# Patient Record
Sex: FEMALE | Race: WHITE | NOT HISPANIC OR LATINO | Employment: FULL TIME | URBAN - METROPOLITAN AREA
[De-identification: names, ages, dates, MRNs, and addresses within clinical notes are randomized per-mention and may not be internally consistent; named-entity substitution may affect disease eponyms.]

---

## 2017-01-09 ENCOUNTER — OFFICE VISIT (OUTPATIENT)
Dept: INTERNAL MEDICINE | Facility: CLINIC | Age: 22
End: 2017-01-09
Payer: COMMERCIAL

## 2017-01-09 VITALS
HEART RATE: 100 BPM | OXYGEN SATURATION: 99 % | WEIGHT: 219.3 LBS | HEIGHT: 67 IN | TEMPERATURE: 98.4 F | DIASTOLIC BLOOD PRESSURE: 84 MMHG | BODY MASS INDEX: 34.42 KG/M2 | SYSTOLIC BLOOD PRESSURE: 128 MMHG

## 2017-01-09 DIAGNOSIS — Z72.0 TOBACCO ABUSE: ICD-10-CM

## 2017-01-09 DIAGNOSIS — Z00.00 ENCOUNTER FOR ROUTINE ADULT HEALTH EXAMINATION WITHOUT ABNORMAL FINDINGS: Primary | ICD-10-CM

## 2017-01-09 DIAGNOSIS — E78.5 HYPERLIPIDEMIA LDL GOAL <160: ICD-10-CM

## 2017-01-09 DIAGNOSIS — F33.0 MAJOR DEPRESSIVE DISORDER, RECURRENT EPISODE, MILD (H): ICD-10-CM

## 2017-01-09 LAB
ALBUMIN SERPL-MCNC: 3.6 G/DL (ref 3.4–5)
ALP SERPL-CCNC: 93 U/L (ref 40–150)
ALT SERPL W P-5'-P-CCNC: 31 U/L (ref 0–50)
ANION GAP SERPL CALCULATED.3IONS-SCNC: 6 MMOL/L (ref 3–14)
AST SERPL W P-5'-P-CCNC: 16 U/L (ref 0–45)
BILIRUB SERPL-MCNC: 0.4 MG/DL (ref 0.2–1.3)
BUN SERPL-MCNC: 7 MG/DL (ref 7–30)
CALCIUM SERPL-MCNC: 9.3 MG/DL (ref 8.5–10.1)
CHLORIDE SERPL-SCNC: 103 MMOL/L (ref 94–109)
CHOLEST SERPL-MCNC: 216 MG/DL
CO2 SERPL-SCNC: 29 MMOL/L (ref 20–32)
CREAT SERPL-MCNC: 0.62 MG/DL (ref 0.52–1.04)
GFR SERPL CREATININE-BSD FRML MDRD: NORMAL ML/MIN/1.7M2
GLUCOSE SERPL-MCNC: 86 MG/DL (ref 70–99)
HDLC SERPL-MCNC: 63 MG/DL
HGB BLD-MCNC: 12.9 G/DL (ref 11.7–15.7)
LDLC SERPL CALC-MCNC: 117 MG/DL
NONHDLC SERPL-MCNC: 153 MG/DL
POTASSIUM SERPL-SCNC: 3.6 MMOL/L (ref 3.4–5.3)
PROT SERPL-MCNC: 7 G/DL (ref 6.8–8.8)
SODIUM SERPL-SCNC: 138 MMOL/L (ref 133–144)
TRIGL SERPL-MCNC: 178 MG/DL
TSH SERPL DL<=0.005 MIU/L-ACNC: 0.63 MU/L (ref 0.4–4)

## 2017-01-09 PROCEDURE — 36415 COLL VENOUS BLD VENIPUNCTURE: CPT | Performed by: INTERNAL MEDICINE

## 2017-01-09 PROCEDURE — 84443 ASSAY THYROID STIM HORMONE: CPT | Performed by: INTERNAL MEDICINE

## 2017-01-09 PROCEDURE — 80061 LIPID PANEL: CPT | Performed by: INTERNAL MEDICINE

## 2017-01-09 PROCEDURE — 85018 HEMOGLOBIN: CPT | Performed by: INTERNAL MEDICINE

## 2017-01-09 PROCEDURE — 80053 COMPREHEN METABOLIC PANEL: CPT | Performed by: INTERNAL MEDICINE

## 2017-01-09 PROCEDURE — 99395 PREV VISIT EST AGE 18-39: CPT | Performed by: INTERNAL MEDICINE

## 2017-01-09 RX ORDER — CITALOPRAM HYDROBROMIDE 40 MG/1
40 TABLET ORAL DAILY
Qty: 90 TABLET | Refills: 3 | Status: SHIPPED | OUTPATIENT
Start: 2017-01-09 | End: 2018-02-25

## 2017-01-09 NOTE — PROGRESS NOTES
SUBJECTIVE:     CC: Ellen Logan is an 21 year old woman who presents for preventive health visit.     Healthy Habits:    Do you get at least three servings of calcium containing foods daily (dairy, green leafy vegetables, etc.)? yes    Amount of exercise or daily activities, outside of work: 4 day(s) per week    Problems taking medications regularly No    Medication side effects: No    Have you had an eye exam in the past two years? no    Do you see a dentist twice per year? no  Do you have sleep apnea, excessive snoring or daytime drowsiness?no    Today's PHQ-2 Score:   PHQ-2 ( 1999 Pfizer) 3/9/2016   Q1: Little interest or pleasure in doing things 0   Q2: Feeling down, depressed or hopeless 0   PHQ-2 Score 0       Abuse: Current or Past(Physical, Sexual or Emotional)- No  Do you feel safe in your environment - Yes    Social History   Substance Use Topics     Smoking status: Current Every Day Smoker -- 0.50 packs/day     Types: Cigarettes     Smokeless tobacco: Not on file     Alcohol Use: Yes      Comment: social     The patient does not drink >3 drinks per day nor >7 drinks per week.    No results for input(s): CHOL, HDL, LDL, TRIG, CHOLHDLRATIO, NHDL in the last 01507 hours.    Reviewed orders with patient.  Reviewed health maintenance and updated orders accordingly - Yes    Mammo Decision Support:  Mammogram not appropriate for this patient based on age.    Pertinent mammograms are reviewed under the imaging tab.  History of abnormal Pap smear: NO - age 21-29 PAP every 3 years recommended  All Histories reviewed and updated in Epic.      ROS:  C: NEGATIVE for fever, chills, change in weight  ENT: NEGATIVE for ear, mouth and throat problems  R: NEGATIVE for significant cough or SOB  CV: NEGATIVE for chest pain, palpitations or peripheral edema  GI: NEGATIVE for nausea, abdominal pain, heartburn, or change in bowel habits  : NEGATIVE for unusual urinary or vaginal symptoms. Periods are regular.  M:  "NEGATIVE for significant arthralgias or myalgia  N: NEGATIVE for weakness, dizziness or paresthesias  P: NEGATIVE for changes in mood or affect      OBJECTIVE:     /84 mmHg  Pulse 100  Temp(Src) 99.4  F (37.4  C) (Oral)  Ht 5' 7\" (1.702 m)  Wt 219 lb 4.8 oz (99.474 kg)  BMI 34.34 kg/m2  SpO2 99%  LMP 12/25/2016  Breastfeeding? No  EXAM:  GENERAL: healthy, alert and no distress  EYES: Eyes grossly normal to inspection, PERRL and conjunctivae and sclerae normal  HENT: ear canals and TM's normal, nose and mouth without ulcers or lesions  NECK: no adenopathy, no asymmetry, masses, or scars and thyroid normal to palpation  RESP: lungs clear to auscultation - no rales, rhonchi or wheezes  CV: regular rate and rhythm, normal S1 S2, no S3 or S4, no murmur, click or rub, no peripheral edema and peripheral pulses strong  ABDOMEN: soft, nontender, no hepatosplenomegaly, no masses and bowel sounds normal, obese  MS: no gross musculoskeletal defects noted, no edema  NEURO: Normal strength and tone, mentation intact and speech normal  PSYCH: mentation appears normal, affect normal/bright    ASSESSMENT/PLAN:     1. Encounter for routine adult health examination without abnormal findings  As ordered  Vaccinations updated per patient.    2. Hyperlipidemia LDL goal <160  As ordered  - Lipid Profile  - Comprehensive metabolic panel    3. Major depressive disorder, recurrent episode, mild (H)  Stable per PHQ 9  - citalopram (CELEXA) 40 MG tablet; Take 1 tablet (40 mg) by mouth daily  Dispense: 90 tablet; Refill: 3  - Hemoglobin  - Comprehensive metabolic panel  - TSH with free T4 reflex    4. Tobacco abuse  Smoking cessation was advised and the risks of continued smoking in regards to this patients health history was reiterated. Options of smoking cessation were also discussed. This time extended beyond the routine exam.      COUNSELING:   Reviewed preventive health counseling, as reflected in patient instructions       " "Regular exercise       Healthy diet/nutrition         reports that she has been smoking Cigarettes.  She has been smoking about 0.50 packs per day. She does not have any smokeless tobacco history on file.  Tobacco Cessation Action Plan: Information offered: Patient not interested at this time  Estimated body mass index is 32.63 kg/(m^2) as calculated from the following:    Height as of 3/9/16: 5' 7\" (1.702 m).    Weight as of 3/9/16: 208 lb 6.4 oz (94.53 kg).       Counseling Resources:  ATP IV Guidelines  Pooled Cohorts Equation Calculator  Breast Cancer Risk Calculator  FRAX Risk Assessment  ICSI Preventive Guidelines  Dietary Guidelines for Americans, 2010  USDA's MyPlate  ASA Prophylaxis  Lung CA Screening    Issa Uribe MD  [facility]    THE MEDICATION LIST HAS BEEN FULLY RECONCILED BY THE M.D. AND THE NURSING STAFF.    "

## 2017-01-09 NOTE — PATIENT INSTRUCTIONS
Preventive Health Recommendations  Female Ages 18 to 25     Yearly exam:     See your health care provider every year in order to  o Review health changes.   o Discuss preventive care.    o Review your medicines if your doctor has prescribed any.      You should be tested each year for STDs (sexually transmitted diseases).       After age 20, talk to your provider about how often you should have cholesterol testing.      Starting at age 21, get a Pap test every three years. If you have an abnormal result, your doctor may have you test more often.      If you are at risk for diabetes, you should have a diabetes test (fasting glucose).     Shots:     Get a flu shot each year.     Get a tetanus shot every 10 years.     Consider getting the shot (vaccine) that prevents cervical cancer (Gardasil).    Nutrition:     Eat at least 5 servings of fruits and vegetables each day.    Eat whole-grain bread, whole-wheat pasta and brown rice instead of white grains and rice.    Talk to your provider about Calcium and Vitamin D.     Lifestyle    Exercise at least 150 minutes a week each week (30 minutes a day, 5 days a week). This will help you control your weight and prevent disease.    Limit alcohol to one drink per day.    No smoking.     Wear sunscreen to prevent skin cancer.    See your dentist every six months for an exam and cleaning.

## 2017-01-09 NOTE — NURSING NOTE
"Chief Complaint   Patient presents with     Physical       Initial /84 mmHg  Pulse 100  Temp(Src) 99.4  F (37.4  C) (Oral)  Ht 5' 7\" (1.702 m)  Wt 219 lb 4.8 oz (99.474 kg)  BMI 34.34 kg/m2  SpO2 99%  LMP 12/25/2016  Breastfeeding? No Estimated body mass index is 34.34 kg/(m^2) as calculated from the following:    Height as of this encounter: 5' 7\" (1.702 m).    Weight as of this encounter: 219 lb 4.8 oz (99.474 kg).  BP completed using cuff size: morgan Freitas CMA      "

## 2017-01-10 ENCOUNTER — TELEPHONE (OUTPATIENT)
Dept: INTERNAL MEDICINE | Facility: CLINIC | Age: 22
End: 2017-01-10

## 2017-11-30 DIAGNOSIS — Z00.00 ROUTINE GENERAL MEDICAL EXAMINATION AT A HEALTH CARE FACILITY: ICD-10-CM

## 2017-11-30 DIAGNOSIS — Z12.4 SCREENING FOR MALIGNANT NEOPLASM OF CERVIX: ICD-10-CM

## 2017-11-30 DIAGNOSIS — Z01.419 ENCOUNTER FOR GYNECOLOGICAL EXAMINATION WITHOUT ABNORMAL FINDING: ICD-10-CM

## 2017-11-30 DIAGNOSIS — Z30.40 ENCOUNTER FOR SURVEILLANCE OF CONTRACEPTIVES: ICD-10-CM

## 2017-11-30 RX ORDER — NORGESTIMATE AND ETHINYL ESTRADIOL 0.25-0.035
KIT ORAL
Qty: 84 TABLET | Refills: 0 | Status: SHIPPED | OUTPATIENT
Start: 2017-11-30 | End: 2018-02-25

## 2018-02-25 DIAGNOSIS — Z01.419 ENCOUNTER FOR GYNECOLOGICAL EXAMINATION WITHOUT ABNORMAL FINDING: ICD-10-CM

## 2018-02-25 DIAGNOSIS — F33.0 MAJOR DEPRESSIVE DISORDER, RECURRENT EPISODE, MILD (H): ICD-10-CM

## 2018-02-25 DIAGNOSIS — Z00.00 ROUTINE GENERAL MEDICAL EXAMINATION AT A HEALTH CARE FACILITY: ICD-10-CM

## 2018-02-25 DIAGNOSIS — Z12.4 SCREENING FOR MALIGNANT NEOPLASM OF CERVIX: ICD-10-CM

## 2018-02-25 DIAGNOSIS — Z30.40 ENCOUNTER FOR SURVEILLANCE OF CONTRACEPTIVES: ICD-10-CM

## 2018-02-25 NOTE — TELEPHONE ENCOUNTER
"Requested Prescriptions   Pending Prescriptions Disp Refills     citalopram (CELEXA) 40 MG tablet [Pharmacy Med Name: CITALOPRAM HYDROBROMIDE 40MG TABS]  Last Written Prescription Date:  1/9/17  Last Fill Quantity: 90 TABLET,  # refills: 3   Last office visit: 1/9/17 with prescribing provider:  AMAN   Future Office Visit:     90 tablet 3     Sig: TAKE ONE TABLET BY MOUTH EVERY DAY    SSRIs Protocol Failed    2/25/2018  4:36 PM       Failed - PHQ-9 score less than 5 in past 6 months    The PHQ-9 criteria is meant to fail. It requires a PHQ-9 score review  PHQ-9 SCORE 12/24/2014 3/9/2016   Total Score 18 -   Total Score - 13     No flowsheet data found.               Failed - No positive pregnancy test in last 12 months       Failed - Recent (6 mo) or future visit with authorizing provider's specialty    Patient had office visit in the last 6 months or has a visit in the next 30 days with authorizing provider.  See \"Patient Info\" tab in [employer], or \"Choose Columns\" in Meds & Orders section of the refill encounter.           Passed - Patient is age 18 or older       Passed - No active pregnancy on record        MONO-LINYAH 0.25-35 MG-MCG per tablet [Pharmacy Med Name: MONO-LINYAH 0.25-35 MG-MCG TABS]  Last Written Prescription Date:  11/30/17  Last Fill Quantity: 84 TABLET,  # refills: 0   Last office visit: 1/9/17 with prescribing provider:  AMAN   Future Office Visit:     84 tablet 0     Sig: TAKE ONE TABLET BY MOUTH EVERY DAY    Contraceptives Protocol Failed    2/25/2018  4:36 PM       Failed - Recent or future visit with authorizing provider's specialty    Patient had office visit in the last year or has a visit in the next 30 days with authorizing provider.  See \"Patient Info\" tab in in[employer] Software[employer], or \"Choose Columns\" in Meds & Orders section of the refill encounter.            Failed - No positive pregnancy test in past 12 months       Passed - Patient is not a current smoker if age is 35 or older       Passed " - No active pregnancy on record

## 2018-02-25 NOTE — LETTER
[facility]  [address] [id number] [phone]            Ellen Logan  [address]  [facility] [place] [address]        February 27, 2018    Dear Ellen,    While refilling your prescription today, we noticed that you are due for an appointment with your provider.  We will refill your prescription for 30 days, but a follow-up appointment must be made before any additional refills can be approved.     Taking care of your health is important to us and we look forward to seeing you in the near future.  Please call us at [phone] or [phone] (or use [facility]) to schedule an appointment.     Please disregard this notice if you have already made an appointment.    Sincerely,        [facility]

## 2018-02-27 RX ORDER — CITALOPRAM HYDROBROMIDE 40 MG/1
TABLET ORAL
Qty: 30 TABLET | Refills: 0 | Status: SHIPPED | OUTPATIENT
Start: 2018-02-27 | End: 2018-03-07

## 2018-02-27 RX ORDER — NORGESTIMATE AND ETHINYL ESTRADIOL 0.25-0.035
KIT ORAL
Qty: 28 TABLET | Refills: 0 | Status: SHIPPED | OUTPATIENT
Start: 2018-02-27 | End: 2018-03-07

## 2018-02-27 NOTE — TELEPHONE ENCOUNTER
Medication is being filled for 1 time refill only due to:  Patient needs to be seen because it has been more than one year since last visit. letter sent

## 2018-03-07 ENCOUNTER — OFFICE VISIT (OUTPATIENT)
Dept: OBGYN | Facility: CLINIC | Age: 23
End: 2018-03-07
Payer: COMMERCIAL

## 2018-03-07 VITALS
SYSTOLIC BLOOD PRESSURE: 122 MMHG | HEIGHT: 68 IN | DIASTOLIC BLOOD PRESSURE: 80 MMHG | BODY MASS INDEX: 32.04 KG/M2 | WEIGHT: 211.4 LBS

## 2018-03-07 DIAGNOSIS — F33.0 MAJOR DEPRESSIVE DISORDER, RECURRENT EPISODE, MILD (H): ICD-10-CM

## 2018-03-07 DIAGNOSIS — Z30.41 ENCOUNTER FOR SURVEILLANCE OF CONTRACEPTIVE PILLS: ICD-10-CM

## 2018-03-07 DIAGNOSIS — Z12.4 SCREENING FOR MALIGNANT NEOPLASM OF CERVIX: ICD-10-CM

## 2018-03-07 DIAGNOSIS — Z01.419 ENCOUNTER FOR GYNECOLOGICAL EXAMINATION WITHOUT ABNORMAL FINDING: ICD-10-CM

## 2018-03-07 DIAGNOSIS — Z00.00 ROUTINE GENERAL MEDICAL EXAMINATION AT A HEALTH CARE FACILITY: ICD-10-CM

## 2018-03-07 PROCEDURE — 99395 PREV VISIT EST AGE 18-39: CPT | Performed by: ADVANCED PRACTICE MIDWIFE

## 2018-03-07 RX ORDER — CITALOPRAM HYDROBROMIDE 40 MG/1
40 TABLET ORAL DAILY
Qty: 30 TABLET | Refills: 11 | Status: SHIPPED | OUTPATIENT
Start: 2018-03-07 | End: 2019-04-16

## 2018-03-07 RX ORDER — NORGESTIMATE AND ETHINYL ESTRADIOL 0.25-0.035
1 KIT ORAL DAILY
Qty: 28 TABLET | Refills: 11 | Status: SHIPPED | OUTPATIENT
Start: 2018-03-07 | End: 2019-02-18

## 2018-03-07 NOTE — PROGRESS NOTES
Ellen is a 22 year old  female who presents for annual exam.     Besides routine health maintenance, she has no other health concerns today .  HPI:  Here for annual.  Would like medication refills on OCPs and depression medications.  No other medical concerns or changes.  Reviewed ACHES, denies FMH or personal of breast cancer, VTE.  Is a current smoker, attempting to quit.   Menses are regular q 28-30 days and normal lasting 4 days.   Menses flow: normal.    Patient's last menstrual period was 2018..   Using oral contraceptives for contraception.    She is not currently considering pregnancy.    REPRODUCTIVE/GYNECOLOGIC HISTORY:  Ellen is sexually active with male partner(s) and is currently in monogamous relationship.   STI testing offered?  Declined  History of abnormal Pap smear:  No  SOCIAL HISTORY  Abuse: does not report having previously been physical or sexually abused.    Do you feel safe in your environment? YES    She  reports that she has been smoking Cigarettes.  She has been smoking about 0.50 packs per day. She has never used smokeless tobacco.  Tobacco Cessation Action Plan: Pharmacotherapies : other Nicotine replacement using Nicotine gum     Last PHQ-9 score on record =   PHQ-9 SCORE 3/7/2018   Total Score -   Total Score 7     Last GAD7 score on record = No flowsheet data found.  Alcohol Score = 3 per week     HEALTH MAINTENANCE:  Cholesterol: (  Cholesterol   Date Value Ref Range Status   2017 216 (H) <200 mg/dL Final     Comment:     Desirable:       <200 mg/dl    History of abnormal lipids: Yes  Mammo: N/A . History of abnormal Mammo: Not applicable.  Regular Self Breast Exams: Yes  TSH: (  TSH   Date Value Ref Range Status   2017 0.63 0.40 - 4.00 mU/L Final    )  Pap; (  Lab Results   Component Value Date    PAP NIL 2015    PAP NIL 2014    )  Immunizations up to date: yes  (Gardasil, Tdap, Flu)  Health maintenance updated:  yes    HEALTHY HABITS  Eating  habits: eats regular meals  Calcium/Vitamin D intake: source:  dairy, dietary supplement(s) Adequate?   Exercise: How often do you exercise? 1-3 times/week;Walking and Cardio  Have you had an eye exam in the last two years? YES  Do you routinely see the dentist once or twice yearly? NO (Recommended)      HISTORY:  Obstetric History       T0      L0     SAB0   TAB0   Ectopic0   Multiple0   Live Births0         Past Medical History:   Diagnosis Date     Anxiety      Depression      No active medical problems      Past Surgical History:   Procedure Laterality Date     ------------OTHER-------------      Cyst removed from head     wisdom teeth           Family History   Problem Relation Age of Onset     Cardiovascular Paternal Grandfather      Cardiovascular Paternal Uncle      D/C age 50     CANCER Maternal Grandmother      Breast     HEART DISEASE Father      Social History     Social History     Marital status: Single     Spouse name: N/A     Number of children: N/A     Years of education: N/A     Social History Main Topics     Smoking status: Current Every Day Smoker     Packs/day: 0.50     Types: Cigarettes     Smokeless tobacco: Never Used     Alcohol use Yes      Comment: social     Drug use: No     Sexual activity: Yes     Partners: Male     Birth control/ protection: Pill     Other Topics Concern      Service No     Blood Transfusions No     Caffeine Concern Yes     Occupational Exposure No     Hobby Hazards No     Sleep Concern No     Stress Concern No     Weight Concern No     Special Diet No     Back Care No     Exercise Yes     Bike Helmet No     Seat Belt Yes     Self-Exams Yes     Social History Narrative       Current Outpatient Prescriptions:      citalopram (CELEXA) 40 MG tablet, TAKE ONE TABLET BY MOUTH EVERY DAY, Disp: 30 tablet, Rfl: 0     MONO-LINYAH 0.25-35 MG-MCG per tablet, TAKE ONE TABLET BY MOUTH EVERY DAY, Disp: 28 tablet, Rfl: 0     Allergies   Allergen  "Reactions     Ceclor [Cefaclor]        Past medical, surgical, social and family history were reviewed and updated in EPIC.    ROS:   C: NEGATIVE for fever, chills, change in weight  I: NEGATIVE for worrisome rashes, moles or lesions  E: NEGATIVE for vision changes or irritation  ENT: NEGATIVE for ear, mouth and throat problems  R: NEGATIVE for significant cough or SOB  B: NEGATIVE for masses, tenderness or discharge  CV: NEGATIVE for chest pain, palpitations or peripheral edema  GI: NEGATIVE for nausea, abdominal pain, heartburn, or change in bowel habits  : NEGATIVE for unusual urinary or vaginal symptoms. Periods are regular.  M: NEGATIVE for significant arthralgias or myalgia  N: NEGATIVE for weakness, dizziness or paresthesias  P: NEGATIVE for changes in mood or affect    PHYSICAL EXAM:  /80 (BP Location: Right arm, Patient Position: Chair, Cuff Size: Adult Large)  Ht 5' 7.5\" (1.715 m)  Wt 211 lb 6.4 oz (95.9 kg)  LMP 02/26/2018  BMI 32.62 kg/m2   BMI: Body mass index is 32.62 kg/(m^2).  Constitutional: healthy, alert and no distress  Neck: symmetrical, thyroid normal size, no masses present, no lymphadenopathy present.   Cardiovascular: RRR, no murmurs present  Respiratory: breathing unlabored, lungs CTA bilaterally  Breast:normal without masses, tenderness or nipple discharge and no palpable axillary masses or adenopathy  Gastrointestinal: abdomen soft, non-tender, bowel sounds present  PELVIC EXAM:  Vulva: No lesions, no adenopathy, BUS WNL  Vagina: Moist, pink, discharge normal  well rugated, no lesions  Cervix:smooth, pink, no visible lesions  Uterus: Normal size, non-tender, mobile  Ovaries: No masses palpated  Rectal exam: deferred    ASSESSMENT/PLAN:  Return to clinic 1 year for well woman exam.   Pap Due 12/2018     COUNSELING:   Reviewed preventive health counseling, as reflected in patient instructions   reports that she has been smoking Cigarettes.  She has been smoking about 0.50 packs " per day. She has never used smokeless tobacco.  Tobacco Cessation Action Plan: Pharmacotherapies : other Nicotine replacement, Offered [facility] tobacco cessation     IRWIN Vee, AMARIM

## 2018-03-07 NOTE — MR AVS SNAPSHOT
"              After Visit Summary   3/7/2018    Ellen Logan    MRN: [id number]           Patient Information     Date Of Birth          1995        Visit Information        Provider Department      3/7/2018 8:30 AM Brittani Mann APRN CNM [facility]        Today's Diagnoses     Screening for malignant neoplasm of cervix        Routine general medical examination at a health care facility        Encounter for gynecological examination without abnormal finding        Encounter for surveillance of contraceptive pills        Major depressive disorder, recurrent episode, mild (H)           Follow-ups after your visit        Follow-up notes from your care team     Return in about 1 year (around 3/7/2019), or if symptoms worsen or fail to improve, for Physical Exam.      Who to contact     If you have questions or need follow up information about today's clinic visit or your schedule please contact [facility] directly at [phone].  Normal or non-critical lab and imaging results will be communicated to you by MyChart, letter or phone within 4 business days after the clinic has received the results. If you do not hear from us within 7 days, please contact the clinic through [employer] or phone. If you have a critical or abnormal lab result, we will notify you by phone as soon as possible.  Submit refill requests through [employer] or call your pharmacy and they will forward the refill request to us. Please allow 3 business days for your refill to be completed.          Additional Information About Your Visit        MyChart Information     [employer] lets you send messages to your doctor, view your test results, renew your prescriptions, schedule appointments and more. To sign up, go to [email or web address] . Click on \"Log in\" on the left side of the screen, which will take you to the Welcome page. Then click on \"Sign up Now\" on the right side of the page. " "    You will be asked to enter the access code listed below, as well as some personal information. Please follow the directions to create your username and password.     Your access code is: [email or web address]  Expires: 2018 11:15 AM     Your access code will  in 90 days. If you need help or a new code, please call your [facility] clinic or [phone].        Care EveryWhere ID     This is your Care EveryWhere ID. This could be used by other organizations to access your [facility] medical records  [id number]        Your Vitals Were     Height Last Period BMI (Body Mass Index)             5' 7.5\" (1.715 m) 2018 32.62 kg/m2          Blood Pressure from Last 3 Encounters:   18 122/80   17 128/84   16 126/80    Weight from Last 3 Encounters:   18 211 lb 6.4 oz (95.9 kg)   17 219 lb 4.8 oz (99.5 kg)   16 208 lb 6.4 oz (94.5 kg)              Today, you had the following     No orders found for display         Today's Medication Changes          These changes are accurate as of 3/7/18 11:15 AM.  If you have any questions, ask your nurse or doctor.               These medicines have changed or have updated prescriptions.        Dose/Directions    citalopram 40 MG tablet   Commonly known as:  celeXA   This may have changed:  See the new instructions.   Used for:  Major depressive disorder, recurrent episode, mild (H)   Changed by:  Brtitani Mann APRN CNM        Dose:  40 mg   Take 1 tablet (40 mg) by mouth daily   Quantity:  30 tablet   Refills:  11       norgestimate-ethinyl estradiol 0.25-35 MG-MCG per tablet   Commonly known as:  MONO-LINYAH   This may have changed:  See the new instructions.   Used for:  Screening for malignant neoplasm of cervix, Routine general medical examination at a health care facility, Encounter for gynecological examination without abnormal finding, Encounter for surveillance of contraceptive pills   Changed by:  Brittani Mann APRN CNM    "     Dose:  1 tablet   Take 1 tablet by mouth daily   Quantity:  28 tablet   Refills:  11            Where to get your medicines      These medications were sent to [id number] [place], [address] AT [address] [place] [address]     Phone:  [phone]     citalopram 40 MG tablet    norgestimate-ethinyl estradiol 0.25-35 MG-MCG per tablet                Primary Care Provider Office Phone # Fax #    Issa Uribe [phone]       [address] [facility] [address]        Equal Access to Services     [facility] POLLY : Hadii beatrice bello hadasho Sochrissieali, waaxda luqadaha, qaybta kaalmada adeegyada, nicol yang. So [facility] [phone].    ATENCIÓN: Si habla español, tiene a brian disposición servicios gratuitos de asistencia lingüística. [facility] [phone].    We comply with applicable federal civil rights laws and [place] laws. We do not discriminate on the basis of race, color, national origin, age, disability, sex, sexual orientation, or gender identity.            Thank you!     Thank you for choosing [facility]  for your care. Our goal is always to provide you with excellent care. Hearing back from our patients is one way we can continue to improve our services. Please take a few minutes to complete the written survey that you may receive in the mail after your visit with us. Thank you!             Your Updated Medication List - Protect others around you: Learn how to safely use, store and throw away your medicines at www.disposemymeds.org.          This list is accurate as of 3/7/18 11:15 AM.  Always use your most recent med list.                   Brand Name Dispense Instructions for use Diagnosis    citalopram 40 MG tablet    celeXA    30 tablet    Take 1 tablet (40 mg) by mouth daily    Major depressive disorder, recurrent episode, mild (H)       norgestimate-ethinyl  estradiol 0.25-35 MG-MCG per tablet    MONO-LINYAH    28 tablet    Take 1 tablet by mouth daily    Screening for malignant neoplasm of cervix, Routine general medical examination at a health care facility, Encounter for gynecological examination without abnormal finding, Encounter for surveillance of contraceptive pills

## 2018-03-07 NOTE — NURSING NOTE
"Chief Complaint   Patient presents with     Gyn Exam     papdue 2018     Refill Request       Initial /80 (BP Location: Right arm, Patient Position: Chair, Cuff Size: Adult Large)  Ht 5' 7.5\" (1.715 m)  Wt 211 lb 6.4 oz (95.9 kg)  LMP 2018  BMI 32.62 kg/m2 Estimated body mass index is 32.62 kg/(m^2) as calculated from the following:    Height as of this encounter: 5' 7.5\" (1.715 m).    Weight as of this encounter: 211 lb 6.4 oz (95.9 kg).  BP completed using cuff size: large        The following HM Due: DG/Nicolás ()      The following patient reported/Care Every where data was sent to:  P ABSTRACT QUALITY INITIATIVES [[id number]]        patient has appointment for today              "

## 2018-03-08 ASSESSMENT — PATIENT HEALTH QUESTIONNAIRE - PHQ9: SUM OF ALL RESPONSES TO PHQ QUESTIONS 1-9: 7

## 2019-02-18 ENCOUNTER — TELEPHONE (OUTPATIENT)
Dept: OBGYN | Facility: CLINIC | Age: 24
End: 2019-02-18

## 2019-02-18 DIAGNOSIS — Z12.4 SCREENING FOR MALIGNANT NEOPLASM OF CERVIX: ICD-10-CM

## 2019-02-18 DIAGNOSIS — Z30.41 ENCOUNTER FOR SURVEILLANCE OF CONTRACEPTIVE PILLS: ICD-10-CM

## 2019-02-18 DIAGNOSIS — Z01.419 ENCOUNTER FOR GYNECOLOGICAL EXAMINATION WITHOUT ABNORMAL FINDING: ICD-10-CM

## 2019-02-18 DIAGNOSIS — Z00.00 ROUTINE GENERAL MEDICAL EXAMINATION AT A HEALTH CARE FACILITY: ICD-10-CM

## 2019-02-18 RX ORDER — NORGESTIMATE AND ETHINYL ESTRADIOL 0.25-0.035
1 KIT ORAL DAILY
Qty: 28 TABLET | Refills: 0 | Status: SHIPPED | OUTPATIENT
Start: 2019-02-18 | End: 2019-03-13

## 2019-02-18 NOTE — TELEPHONE ENCOUNTER
Pt calling requesting a refill on her ocp.     rx approved x 1. Pt is due for her annual exam. Pt advised.  appt made.    Bianca GRANADOS RN

## 2019-03-13 ENCOUNTER — OFFICE VISIT (OUTPATIENT)
Dept: OBGYN | Facility: CLINIC | Age: 24
End: 2019-03-13
Payer: COMMERCIAL

## 2019-03-13 VITALS
WEIGHT: 221.5 LBS | DIASTOLIC BLOOD PRESSURE: 70 MMHG | HEIGHT: 67 IN | BODY MASS INDEX: 34.76 KG/M2 | SYSTOLIC BLOOD PRESSURE: 120 MMHG

## 2019-03-13 DIAGNOSIS — Z30.41 ENCOUNTER FOR SURVEILLANCE OF CONTRACEPTIVE PILLS: ICD-10-CM

## 2019-03-13 DIAGNOSIS — Z01.419 WELL WOMAN EXAM: Primary | ICD-10-CM

## 2019-03-13 DIAGNOSIS — Z30.09 COUNSELING FOR BIRTH CONTROL REGARDING INTRAUTERINE DEVICE (IUD): ICD-10-CM

## 2019-03-13 PROCEDURE — 99213 OFFICE O/P EST LOW 20 MIN: CPT | Mod: 25 | Performed by: ADVANCED PRACTICE MIDWIFE

## 2019-03-13 PROCEDURE — 99395 PREV VISIT EST AGE 18-39: CPT | Performed by: ADVANCED PRACTICE MIDWIFE

## 2019-03-13 RX ORDER — NORGESTIMATE AND ETHINYL ESTRADIOL 0.25-0.035
1 KIT ORAL DAILY
Qty: 28 TABLET | Refills: 1 | Status: SHIPPED | OUTPATIENT
Start: 2019-03-13 | End: 2019-04-01

## 2019-03-13 ASSESSMENT — MIFFLIN-ST. JEOR: SCORE: 1782.35

## 2019-03-13 NOTE — PATIENT INSTRUCTIONS
We discussed benefits, risks, side effects of IUD and placement including pain, cramping, expulsion, uterine perforation and increased risk of PID with chlamydia/gonorrhea exposure. Remember to take 600mg of Ibuprofen at least 30 minutes before your procedure. You do have to have a urine pregnancy test the day of IUD placement prior to the procedure.  Please avoid intercourse at least 2 weeks prior to the procedure.     Please don't hesitate to contact me with any questions prior to your appointment.

## 2019-03-13 NOTE — NURSING NOTE
"Chief Complaint   Patient presents with     Physical     Bc refill and discuss IUD       Initial /70   Ht 1.702 m (5' 7\")   Wt 100.5 kg (221 lb 8 oz)   LMP 2019   BMI 34.69 kg/m   Estimated body mass index is 34.69 kg/m  as calculated from the following:    Height as of this encounter: 1.702 m (5' 7\").    Weight as of this encounter: 100.5 kg (221 lb 8 oz).  BP completed using cuff size: regular    Questioned patient about current smoking habits.  Pt. has never smoked.          The following HM Due: NONE    Pt coming in for annual she would like to discuss an IUD for birthcontrol.     Karyna Patterson MA      "

## 2019-03-13 NOTE — PROGRESS NOTES
Ellen is a 23 year old  female who presents for annual exam.     Besides routine health maintenance,  she would like to discuss changing birth control.  HPI:  Due for annual. Currently on COCs states she has not had side effects, does not like taking a pill every day. Due for pap. No other roxanne concerns.   Menses are regular q 28-30 days and normal lasting 5 days.   Menses flow: normal.    Patient's last menstrual period was 2019..   Using oral contraceptives for contraception.    She is not currently considering pregnancy.    REPRODUCTIVE/GYNECOLOGIC HISTORY:  Ellen is sexually active with male partner(s) and is currently in monogamous relationship.   STI testing offered?  Declined  History of abnormal Pap smear:  No  SOCIAL HISTORY  Abuse: does not report having previously been physical or sexually abused.    Do you feel safe in your environment? YES    She  reports that she has been smoking cigarettes.  She has been smoking about 0.50 packs per day. she has never used smokeless tobacco.  Tobacco Cessation Action Plan: Information offered: Patient not interested at this time. States she is quitting on her own.     Last PHQ-9 score on record =   PHQ-9 SCORE 3/7/2018   PHQ-9 Total Score -   PHQ-9 Total Score 7     Last GAD7 score on record = No flowsheet data found.  Alcohol Score = once a week     HEALTH MAINTENANCE:  Cholesterol: (  Cholesterol   Date Value Ref Range Status   2017 216 (H) <200 mg/dL Final     Comment:     Desirable:       <200 mg/dl    History of abnormal lipids: Yes  Mammo: na . History of abnormal Mammo: Not applicable.  Regular Self Breast Exams: Yes  TSH: (  TSH   Date Value Ref Range Status   2017 0.63 0.40 - 4.00 mU/L Final    )  Pap; (  Lab Results   Component Value Date    PAP NIL 2015    PAP NIL 2014    )  Immunizations up to date: no  (Gardasil, Tdap, Flu)  Health maintenance updated:  yes    HEALTHY HABITS  Eating habits: follows a balanced  nutrition diet  Calcium/Vitamin D intake: source:  dairy Adequate?   Exercise: How often do you exercise? 3-5 times/week;Cardio  Have you had an eye exam in the last two years? YES  Do you routinely see the dentist once or twice yearly? NO (Recommended)    HISTORY:  Obstetric History       T0      L0     SAB0   TAB0   Ectopic0   Multiple0   Live Births0         Past Medical History:   Diagnosis Date     Anxiety      Depression      No active medical problems      Past Surgical History:   Procedure Laterality Date     ------------OTHER-------------      Cyst removed from head     wisdom teeth           Family History   Problem Relation Age of Onset     Cardiovascular Paternal Grandfather      Cardiovascular Paternal Uncle         D/C age 50     Cancer Maternal Grandmother         Breast     Heart Disease Father      Social History     Socioeconomic History     Marital status: Single     Spouse name: None     Number of children: None     Years of education: None     Highest education level: None   Occupational History     None   Social Needs     Financial resource strain: None     Food insecurity:     Worry: None     Inability: None     Transportation needs:     Medical: None     Non-medical: None   Tobacco Use     Smoking status: Current Every Day Smoker     Packs/day: 0.50     Types: Cigarettes     Smokeless tobacco: Never Used   Substance and Sexual Activity     Alcohol use: Yes     Comment: social     Drug use: No     Sexual activity: Yes     Partners: Male     Birth control/protection: Pill   Lifestyle     Physical activity:     Days per week: None     Minutes per session: None     Stress: None   Relationships     Social connections:     Talks on phone: None     Gets together: None     Attends Worship service: None     Active member of club or organization: None     Attends meetings of clubs or organizations: None     Relationship status: None     Intimate partner violence:     Fear of  "current or ex partner: None     Emotionally abused: None     Physically abused: None     Forced sexual activity: None   Other Topics Concern      Service No     Blood Transfusions No     Caffeine Concern Yes     Occupational Exposure No     Hobby Hazards No     Sleep Concern No     Stress Concern No     Weight Concern No     Special Diet No     Back Care No     Exercise Yes     Bike Helmet No     Seat Belt Yes     Self-Exams Yes   Social History Narrative     None       Current Outpatient Medications:      citalopram (CELEXA) 40 MG tablet, Take 1 tablet (40 mg) by mouth daily, Disp: 30 tablet, Rfl: 11     norgestimate-ethinyl estradiol (MONO-LINYAH) 0.25-35 MG-MCG tablet, Take 1 tablet by mouth daily, Disp: 28 tablet, Rfl: 0     Allergies   Allergen Reactions     Ceclor [Cefaclor]        Past medical, surgical, social and family history were reviewed and updated in [facility].    ROS:   CONSTITUTIONAL: NEGATIVE for fever, chills, change in weight  RESP: NEGATIVE for significant cough or SOB  BREAST: NEGATIVE for masses, tenderness or discharge  CV: NEGATIVE for chest pain, palpitations or peripheral edema  GI: NEGATIVE for nausea, abdominal pain, heartburn, or change in bowel habits  : NEGATIVE for unusual urinary or vaginal symptoms. Periods are regular.  MUSCULOSKELETAL: NEGATIVE for significant arthralgias or myalgia  NEURO: NEGATIVE for weakness, dizziness or paresthesias  PSYCHIATRIC: NEGATIVE for changes in mood or affect    PHYSICAL EXAM:  /70   Ht 1.702 m (5' 7\")   Wt 100.5 kg (221 lb 8 oz)   LMP 02/27/2019   BMI 34.69 kg/m     BMI: Body mass index is 34.69 kg/m .  Constitutional: healthy, alert and no distress  Neck: symmetrical, thyroid normal size, no masses present, no lymphadenopathy present.   Cardiovascular: RRR, no murmurs present  Respiratory: breathing unlabored, lungs CTA bilaterally  Breast:normal without masses, tenderness or nipple discharge and no palpable axillary masses or " adenopathy  Gastrointestinal: abdomen soft, non-tender, bowel sounds present  PELVIC EXAM:  Vulva: No lesions, no adenopathy, BUS WNL  Vagina: Moist, pink, discharge normal  well rugated, no lesions  Cervix:smooth, pink, no visible lesions  Uterus: Normal size, non-tender, mobile  Ovaries: No masses palpated  Rectal exam: deferred    ASSESSMENT/PLAN:  No diagnosis found.  Results for orders placed or performed in visit on 01/09/17   Lipid Profile   Result Value Ref Range    Cholesterol 216 (H) <200 mg/dL    Triglycerides 178 (H) <150 mg/dL    HDL Cholesterol 63 >49 mg/dL    LDL Cholesterol Calculated 117 (H) <100 mg/dL    Non HDL Cholesterol 153 (H) <130 mg/dL   Hemoglobin   Result Value Ref Range    Hemoglobin 12.9 11.7 - 15.7 g/dL   Comprehensive metabolic panel   Result Value Ref Range    Sodium 138 133 - 144 mmol/L    Potassium 3.6 3.4 - 5.3 mmol/L    Chloride 103 94 - 109 mmol/L    Carbon Dioxide 29 20 - 32 mmol/L    Anion Gap 6 3 - 14 mmol/L    Glucose 86 70 - 99 mg/dL    Urea Nitrogen 7 7 - 30 mg/dL    Creatinine 0.62 0.52 - 1.04 mg/dL    GFR Estimate >90  Non  GFR Calc   >60 mL/min/1.7m2    GFR Estimate If Black >90   GFR Calc   >60 mL/min/1.7m2    Calcium 9.3 8.5 - 10.1 mg/dL    Bilirubin Total 0.4 0.2 - 1.3 mg/dL    Albumin 3.6 3.4 - 5.0 g/dL    Protein Total 7.0 6.8 - 8.8 g/dL    Alkaline Phosphatase 93 40 - 150 U/L    ALT 31 0 - 50 U/L    AST 16 0 - 45 U/L   TSH with free T4 reflex   Result Value Ref Range    TSH 0.63 0.40 - 4.00 mU/L     Return to clinic 1 year for well woman exam.       COUNSELING:   Reviewed preventive health counseling, as reflected in patient instructions       Regular exercise       Healthy diet/nutrition       Contraception   Patient interested in switching to IUD. Reviewed Hormonal vs non hormonal IUD types. Reviewed risks and benefits of IUD and procedure. Handouts given. Instructed to return to clinic for IUD placement, avoid unprotected  intercourse 2 weeks prior, continue MARY until placement.   -Take 600mg Ibuprofen pre procedure      reports that she has been smoking cigarettes.  She has been smoking about 0.50 packs per day. she has never used smokeless tobacco.  Tobacco Cessation Action Plan: Information offered: Patient not interested at this time    Brittani Sanchez, CINTHIA, APRN, CNM      In addition to annual physical 15 minutes was spent face to face with the patient today discussing her history, IUD contraception, procedure, risks and benefits.

## 2019-03-27 ENCOUNTER — OFFICE VISIT (OUTPATIENT)
Dept: OBGYN | Facility: CLINIC | Age: 24
End: 2019-03-27
Payer: COMMERCIAL

## 2019-03-27 VITALS — WEIGHT: 218.3 LBS | DIASTOLIC BLOOD PRESSURE: 76 MMHG | BODY MASS INDEX: 34.19 KG/M2 | SYSTOLIC BLOOD PRESSURE: 118 MMHG

## 2019-03-27 DIAGNOSIS — Z12.4 CERVICAL CANCER SCREENING: ICD-10-CM

## 2019-03-27 DIAGNOSIS — Z30.430 ENCOUNTER FOR INSERTION OF INTRAUTERINE CONTRACEPTIVE DEVICE: Primary | ICD-10-CM

## 2019-03-27 LAB — HCG, QUAL URINE: NORMAL

## 2019-03-27 PROCEDURE — G0145 SCR C/V CYTO,THINLAYER,RESCR: HCPCS | Performed by: ADVANCED PRACTICE MIDWIFE

## 2019-03-27 PROCEDURE — 84703 CHORIONIC GONADOTROPIN ASSAY: CPT | Performed by: ADVANCED PRACTICE MIDWIFE

## 2019-03-27 PROCEDURE — 58300 INSERT INTRAUTERINE DEVICE: CPT | Performed by: ADVANCED PRACTICE MIDWIFE

## 2019-03-27 NOTE — LETTER
April 4, 2019      Ellen Logan  [address]  [place] [address]    Dear ,      I am happy to inform you that your recent cervical cancer screening test (PAP smear) was normal.      Preventative screenings such as this help to ensure your health for years to come. You should repeat a pap smear in 3 years, unless otherwise directed.      You will still need to return to the clinic every year for your annual exam and other preventive tests.     If you have additional questions regarding this result, please call our registered nurse, Shraddha at [phone].      Sincerely,      IRIWN Mcneal CNM/[facility]

## 2019-03-27 NOTE — NURSING NOTE
"Chief Complaint   Patient presents with     IUD     Kyleena Insert       Initial /76 (BP Location: Left arm, Patient Position: Chair, Cuff Size: Adult Large)   Wt 99 kg (218 lb 4.8 oz)   LMP 2019 (Exact Date)   Breastfeeding? No   BMI 34.19 kg/m   Estimated body mass index is 34.19 kg/m  as calculated from the following:    Height as of 3/13/19: 1.702 m (5' 7\").    Weight as of this encounter: 99 kg (218 lb 4.8 oz).  BP completed using cuff size: large    Questioned patient about current smoking habits.  Pt. currently smokes.  Advised about smoking cessation.          The following HM Due: NONE      Pt took 500 mg of Excedrine 1 hour ago.      Bren Luo CMA on 3/27/2019 at 11:52 AM         "

## 2019-03-27 NOTE — PROGRESS NOTES
INDICATIONS:                                                      Is a pregnancy test required: Yes.  Was it positive or negative?  Negative  Was a consent obtained?  Yes    Ellen Logan is a 23 year old female,   who presents for insertion of an IUD. The risks, benefits and alternatives of IUD insertion were discussed in detail previously. She also has reviewed the product brochure.  She has elected to go ahead with the insertion  today and her questions were answered. Consent was signed. Her LMP began on 3-25-18 and was normal in duration and amount of flow. A pregnancy test was performed today:  Yes    Today's PHQ-2 Score:   PHQ-2 (  [employer]) 3/7/2018   Q1: Little interest or pleasure in doing things 1   Q2: Feeling down, depressed or hopeless 1   PHQ-2 Score 2       PROCEDURE:                                                      The pelvic exam revealed normal external genitalia. On bimanual exam the uterus was Anteverted and normal in size with no tenderness present. A speculum was inserted into the vagina and the cervix was visualized. The cervix was prepped with Betadine . The anterior lip of the cervix was grasped with a single toothed tenaculum. The uterus sounded to 8 cm. A Kyleena IUD was then inserted without difficulty. The string was cut to 3 cm.  The patient experienced a moderate amount of cramping.    POST PROCEDURE:                                                      She  tolerated the procedure well with minimal discomfort. There were no complications. Patient was discharged in stable condition.    Return to clinic in 5 weeks for IUD check.  Call if severe cramping, fever, abnormal bleeding, abnormal discharge or pelvic pain develop.  Patient was counseled about the chance of irregular bleeding.  Pap collected during procedure.   IRWIN Mcneal CNM

## 2019-04-01 LAB
COPATH REPORT: NORMAL
PAP: NORMAL

## 2019-04-16 DIAGNOSIS — F33.0 MAJOR DEPRESSIVE DISORDER, RECURRENT EPISODE, MILD (H): ICD-10-CM

## 2019-04-16 RX ORDER — CITALOPRAM HYDROBROMIDE 40 MG/1
40 TABLET ORAL DAILY
Qty: 30 TABLET | Refills: 11 | Status: SHIPPED | OUTPATIENT
Start: 2019-04-16 | End: 2021-06-02

## 2019-04-16 ASSESSMENT — PATIENT HEALTH QUESTIONNAIRE - PHQ9: SUM OF ALL RESPONSES TO PHQ QUESTIONS 1-9: 8

## 2019-04-16 NOTE — TELEPHONE ENCOUNTER
citalopram      Last Written Prescription Date:  3/7/18  Last Fill Quantity: 30,   # refills: 11  Last Office Visit: 3/27/19  Future Office visit:

## 2019-04-16 NOTE — TELEPHONE ENCOUNTER
Prescription approved per [facility] Refill Protocol.  Bianca GRANADOS RN

## 2021-06-02 ENCOUNTER — OFFICE VISIT (OUTPATIENT)
Dept: INTERNAL MEDICINE | Facility: CLINIC | Age: 26
End: 2021-06-02
Payer: COMMERCIAL

## 2021-06-02 VITALS
DIASTOLIC BLOOD PRESSURE: 76 MMHG | HEART RATE: 83 BPM | BODY MASS INDEX: 31.98 KG/M2 | SYSTOLIC BLOOD PRESSURE: 106 MMHG | OXYGEN SATURATION: 98 % | TEMPERATURE: 96.8 F | WEIGHT: 211 LBS | HEIGHT: 68 IN

## 2021-06-02 DIAGNOSIS — G47.00 INSOMNIA, UNSPECIFIED TYPE: ICD-10-CM

## 2021-06-02 DIAGNOSIS — F41.9 ANXIETY: ICD-10-CM

## 2021-06-02 DIAGNOSIS — F33.0 MAJOR DEPRESSIVE DISORDER, RECURRENT EPISODE, MILD (H): Primary | ICD-10-CM

## 2021-06-02 PROCEDURE — 99204 OFFICE O/P NEW MOD 45 MIN: CPT | Performed by: INTERNAL MEDICINE

## 2021-06-02 RX ORDER — TRAZODONE HYDROCHLORIDE 50 MG/1
50-100 TABLET, FILM COATED ORAL AT BEDTIME
Qty: 45 TABLET | Refills: 1 | Status: SHIPPED | OUTPATIENT
Start: 2021-06-02 | End: 2021-07-13

## 2021-06-02 RX ORDER — CITALOPRAM HYDROBROMIDE 20 MG/1
TABLET ORAL
Qty: 30 TABLET | Refills: 1 | Status: SHIPPED | OUTPATIENT
Start: 2021-06-02 | End: 2021-07-29

## 2021-06-02 ASSESSMENT — MIFFLIN-ST. JEOR: SCORE: 1732.65

## 2021-06-02 NOTE — PROGRESS NOTES
"    Assessment & Plan     (F33.0) Major depressive disorder, recurrent episode, mild (H)  (primary encounter diagnosis)  Comment: Depression and anxiety are both returning.    Spoke at length about mood disorders including suspected pathophysiology, role of neurotransmitters, and treatment options including medication options ( especially SSRIs that treat cause of sx) and counselling.   Start citalopram 10 mg once daily for the first week then increase to 20 mg once a day.    She previously was on 40 mg dose citalopram.  We may consider titrating up to that dose if needed.  Follow-up in approximately 4 to 6 weeks via virtual visit.  Reviewed most common side effects of the medication.  Plan: citalopram (CELEXA) 20 MG tablet, traZODone         (DESYREL) 50 MG tablet            (F41.9) Anxiety  Comment: As above  Plan:     (G47.00) Insomnia, unspecified type  Comment: Mild insomnia social with her underlying depression/anxiety.  Having difficulty falling asleep.  Will give trazodone temporarily.    Plan: traZODone (DESYREL) 50 MG tablet                        BMI:   Estimated body mass index is 32.56 kg/m  as calculated from the following:    Height as of this encounter: 1.715 m (5' 7.5\").    Weight as of this encounter: 95.7 kg (211 lb).           Return in about 5 weeks (around 7/7/2021) for Virtual visit (video or phone), Mood check.    Lyndon Vasquez MD  [facility]    Rd Hughes is a 25 year old who presents for the following health issues     HPI requesting anxiety meds    Depression and Anxiety Follow-Up    How are you doing with your depression since your last visit? Worsened     How are you doing with your anxiety since your last visit?  Worsened     Are you having other symptoms that might be associated with depression or anxiety? Yes:   see screening    Have you had a significant life event? No     Do you have any concerns with your use of alcohol or other " drugs? No    --depression anxiety off and on for last several years.  Has been on citalopram in the past with fair control of symptoms.  She took it for a little over a year then discontinued was doing fine.  Over the last several months she is noticed worsening of depression anxiety symptoms in general.  Denies marc panic attacks.  Denies homicidal or suicidal ideations.  Remains no specific side effects from citalopram.  She is hoping to resume citalopram    Social History     Tobacco Use     Smoking status: Former Smoker     Packs/day: 0.50     Types: Cigarettes     Quit date: 2020     Years since quittin.5     Smokeless tobacco: Never Used   Substance Use Topics     Alcohol use: Yes     Comment: social     Drug use: No     PHQ 3/9/2016 3/7/2018 2019   PHQ-9 Total Score 13 7 8   Q9: Thoughts of better off dead/self-harm past 2 weeks Not at all Not at all Not at all     No flowsheet data found.  Last PHQ-9 2019   1.  Little interest or pleasure in doing things 1   2.  Feeling down, depressed, or hopeless 1   3.  Trouble falling or staying asleep, or sleeping too much 2   4.  Feeling tired or having little energy 2   5.  Poor appetite or overeating 1   6.  Feeling bad about yourself 1   7.  Trouble concentrating 0   8.  Moving slowly or restless 0   Q9: Thoughts of better off dead/self-harm past 2 weeks 0   PHQ-9 Total Score 8   Difficulty at work, home, or with people Not difficult at all     No flowsheet data found.    Suicide Assessment Five-step Evaluation and Treatment (SAFE-T)      How many servings of fruits and vegetables do you eat daily?      On average, how many sweetened beverages do you drink each day (Examples: soda, juice, sweet tea, etc.  Do NOT count diet or artificially sweetened beverages)?       How many days per week do you exercise enough to make your heart beat faster?     How many minutes a day do you exercise enough to make your heart beat faster?     How many days per  "week do you miss taking your medication?         Review of Systems   Constitutional, HEENT, cardiovascular, pulmonary, gi and gu systems are negative, except as otherwise noted.      Objective    /76   Pulse 83   Temp 96.8  F (36  C) (Tympanic)   Ht 1.715 m (5' 7.5\")   Wt 95.7 kg (211 lb)   SpO2 98%   BMI 32.56 kg/m    Body mass index is 32.56 kg/m .  Physical Exam   GENERAL alert and no distress  EYES:  Normal sclera,conjunctiva, EOMI  HENT: facies symmetric  MS: extremities- no gross deformities of the visible extremities noted,   PSYCH: Alert and oriented times 3; speech- coherent  SKIN:  No obvious significant skin lesions on visible portions of face   NEURO:  Normal facial movements.  Appears to move normally            "

## 2021-06-02 NOTE — PATIENT INSTRUCTIONS
"  DEPRESSION:     *  Your mood is low, called \"depression\".  You did not cause this by any specific act.  It just happens.  Depression is a very common occurrence in [place], it is estimated that 1 out of 5 Americans may experience depression at some point in their lives.     *  No one \"causes\" depression, depression or anxiety it NOT a choice.      *  Due to the extent that your mood is bothering you, I am going to ask that you take a specific medication to help reduce the symptoms and the impact of the depression on your life.  Anti-depressant medications do not \"fix\" any specific problem in your life, but they hopefully will provide a more stable \"emotional platform\" from which to go about your life.      *  Start Citalopram (generic Celexa) 10 mg (1/2 of 20 mg tablet) once per day for 10 days, then 20 mg once per day.  Let us know if you experience any significant side effects, which may include disordered appetite, disordered sleeping, intestinal upset.  Specifically let us know if you experience increase anxiety or depression.  Call [phone] ([facility] Internal Medicine Nurse Line) with any questions or concerns.     *  Insomnia is a common symptoms and occurrence associated with depression.    To help you sleep and to help stabilize your sleep patterns, start Trazodone 50 mg at bedtime.  If this does not help you get off to sleep after 2-3 weeks, then increase the dose to 100 mg at bedtime as needed.      *  I recommend taking Vitamin D3 2000 units per day , which you can get at any pharmacy and most grocery stores (the cheapest prices are at [employer] and [employer]).    While the complete clinical significance of Vitamin D levels still remains to be determined, there is enough data to recommend supplementation whenever lower values are seen as it has been shown to help bone health, immune system function, and treat seasonal affective disorder.      *  I would strongly recommend working with a mental health " "counselor/therapist to help understand your mood better, to learn better coping skills, and to find ways above and beyond medications to to help manage your mood condition.  I can provide a referral if you desire.        Visit the National [place] of Mental Health (www.nimh.com) for more detailed information about depression and many other mental health topics.  It is important that you develop a better understanding about depression and therefore what has been happening to you     * Follow up me via virtual visit in 4-6 weeks regardless of how you feel to follow up on these issues. Use [employer] or Call [phone] to schedule the appointment with me and lab appointment.        Resources for any acute mental health issues:    *Crisis Intervention: 596.752.9408 or 831-509-9870 (TTY: 652.729.5265).  Call anytime for help.   *Crisis text line: Text \"START\" to 119-398   Free - confidential - 24 hours per day/7 days per week  *[facility] Crisis: COPE: (998.972.3155) 24 hour mobile crisis support for people having a mental health crisis in [facility].    *National Suicide Prevention  6-349-248-4965  *Acute Psychiatric Services (242-617-6611). 24-hour walk-in crisis psychiatric support at [facility]; Emergency Medications Clinic available 7:30am - 2:00pm  *Crisis Connection: (579.670.7066) 24-hour confidential telephone counseling    *[facility] Emergency Room: [phone]  *Suicide Awareness Voices of Education (SAVE) (www.save.org)  567.415.SAVE (8665)  *Mental Health Consumer/Survivor Network of [place] (www.mhcsn.net): 458.313.7697 or 166-807-2494    *Mental Health Association of [place] (www.mentalhealth.org): 553.864.8777 or 841-462-1713    *Self- Management and Recovery Training., SMART-- Toll free: 119.226.5009  www.[employer].org    *SELENE [place] (National [place] on Mental Illness):  phone: 229.340.2828  toll free: 1.888.SELENE.HELPS  fax: [phone]  email: " [place]@[email or web address]  **Call or visit website for information on support groups for individuals or families.

## 2021-07-13 DIAGNOSIS — G47.00 INSOMNIA, UNSPECIFIED TYPE: ICD-10-CM

## 2021-07-13 DIAGNOSIS — F33.0 MAJOR DEPRESSIVE DISORDER, RECURRENT EPISODE, MILD (H): ICD-10-CM

## 2021-07-13 RX ORDER — TRAZODONE HYDROCHLORIDE 50 MG/1
TABLET, FILM COATED ORAL
Qty: 45 TABLET | Refills: 1 | Status: SHIPPED | OUTPATIENT
Start: 2021-07-13 | End: 2021-12-01

## 2021-07-29 DIAGNOSIS — F33.0 MAJOR DEPRESSIVE DISORDER, RECURRENT EPISODE, MILD (H): ICD-10-CM

## 2021-07-29 RX ORDER — CITALOPRAM HYDROBROMIDE 20 MG/1
20 TABLET ORAL DAILY
Qty: 30 TABLET | Refills: 1 | Status: SHIPPED | OUTPATIENT
Start: 2021-07-29 | End: 2021-11-14

## 2021-07-29 NOTE — TELEPHONE ENCOUNTER
Routing refill request to provider for review/approval because:  Labs not current:    PHQ 3/9/2016 3/7/2018 4/16/2019   PHQ-9 Total Score 13 7 8   Q9: Thoughts of better off dead/self-harm past 2 weeks Not at all Not at all Not at all       Yousif Andino, RN  [facility] Triage Nurse

## 2021-11-11 DIAGNOSIS — F33.0 MAJOR DEPRESSIVE DISORDER, RECURRENT EPISODE, MILD (H): ICD-10-CM

## 2021-11-11 NOTE — TELEPHONE ENCOUNTER
Routing refill request to provider for review/approval because:  PHQ-9 score:    PHQ 4/16/2019   PHQ-9 Total Score 8   Q9: Thoughts of better off dead/self-harm past 2 weeks Not at all         Yousif Andino RN  [facility] Triage Nurse

## 2021-11-11 NOTE — TELEPHONE ENCOUNTER
Reason for Call:  Medication or medication refill:    Do you use a [facility]?  Name of the pharmacy and phone number for the current request: [facility] [address], [place] [address]    Name of the medication requested: citalopram (CELEXA) 20 MG tablet, traZODone (DESYREL) 50 MG tablet    Other request: Pt called requesting refill    Can we leave a detailed message on this number? YES    Phone number patient can be reached at: Home number on file [phone] (home)    Best Time: anytime    Call taken on 11/11/2021 at 1:02 PM by Chelsie York

## 2021-11-14 RX ORDER — CITALOPRAM HYDROBROMIDE 20 MG/1
20 TABLET ORAL DAILY
Qty: 30 TABLET | Refills: 0 | Status: SHIPPED | OUTPATIENT
Start: 2021-11-14 | End: 2021-12-01

## 2021-11-15 NOTE — TELEPHONE ENCOUNTER
Please call the patient.     1 month prescription sent electronically to the specified pharmacy.    Please have her schedule a follow up appointment with us, virtual (telephone or video) is fine.  We have not followed up with her since the medication was resumed in June.     Close encounter when done.

## 2021-12-01 ENCOUNTER — VIRTUAL VISIT (OUTPATIENT)
Dept: INTERNAL MEDICINE | Facility: CLINIC | Age: 26
End: 2021-12-01
Payer: COMMERCIAL

## 2021-12-01 DIAGNOSIS — F33.0 MAJOR DEPRESSIVE DISORDER, RECURRENT EPISODE, MILD (H): Primary | ICD-10-CM

## 2021-12-01 DIAGNOSIS — G47.00 INSOMNIA, UNSPECIFIED TYPE: ICD-10-CM

## 2021-12-01 PROCEDURE — 99213 OFFICE O/P EST LOW 20 MIN: CPT | Mod: 95 | Performed by: INTERNAL MEDICINE

## 2021-12-01 RX ORDER — CITALOPRAM HYDROBROMIDE 20 MG/1
20 TABLET ORAL DAILY
Qty: 90 TABLET | Refills: 1 | Status: SHIPPED | OUTPATIENT
Start: 2021-12-01 | End: 2022-06-09

## 2021-12-01 RX ORDER — TRAZODONE HYDROCHLORIDE 50 MG/1
50 TABLET, FILM COATED ORAL
Qty: 90 TABLET | Refills: 1 | Status: SHIPPED | OUTPATIENT
Start: 2021-12-01 | End: 2023-03-07

## 2021-12-01 RX ORDER — CHOLECALCIFEROL (VITAMIN D3) 50 MCG
1 TABLET ORAL DAILY
COMMUNITY
Start: 2021-12-01

## 2021-12-01 NOTE — PROGRESS NOTES
"Ellen is a 26 year old who is being evaluated via a billable video visit.      How would you like to obtain your AVS? Mail a copy  If the video visit is dropped, the invitation should be resent by: Text to cell phone: [phone]  Will anyone else be joining your video visit? No        Assessment & Plan     (F33.0) Major depressive disorder, recurrent episode, mild (H)  (primary encounter diagnosis)  Comment: Doing well, continue same dose of 20 mg once per day  Follow up in 6 months approximately.   Plan: citalopram (CELEXA) 20 MG tablet, traZODone         (DESYREL) 50 MG tablet            (G47.00) Insomnia, unspecified type  Comment: works well, just with 50 mg dose.   OK to refill  Beware of drowsiness with any sleepming medication   Plan: traZODone (DESYREL) 50 MG tablet                        BMI:   Estimated body mass index is 32.56 kg/m  as calculated from the following:    Height as of 6/2/21: 1.715 m (5' 7.5\").    Weight as of 6/2/21: 95.7 kg (211 lb).           Return in about 6 months (around 6/1/2022) for Virtual visit (video or phone), Medication check, Mood check.    Lyndon Vasquez MD  [facility]    Subjective   Ellen is a 26 year old who presents for the following health issues     HPI     Medication Followup of  Citalopram, Trazodone    Taking Medication as prescribed: yes    Side Effects:  None    Medication Helping Symptoms:  yes     Depression:  Has known history of depression.  Has been on medication for this.  The patient does not report any significant side effects of this medication.  The prior symptoms leading to the original diagnosis and decision to start medication therapy are better.     Appetite is stable.  Sleeping patterns are stable.    No reported thoughts of suicide or homicide.    PHQ 3/7/2018 4/16/2019 12/1/2021   PHQ-9 Total Score 7 8 10   Q9: Thoughts of better off dead/self-harm past 2 weeks Not at all Not at all Not at all    " "  Resume citalopram 20 mg once daily this summer.  Has felt that improved her mood noticeably.  She was previously on the 40 mg dose in years past, but we decided to just start with 20 mg for now.  Briefly ran out of medicine for a while, noticed a change in mood for the worse.  Resumed the citalopram and felt better.    Sleeping is better with trazodone, no side effects reported.   Takes 50 mg dose usually.     Due for moderna booster, has not yet gotten    **I reviewed the information recorded in the patient's EPIC chart (including but not limited to medical history, surgical history, family history, problem list, medication list, and allergy list) and updated the information as indicated based on the patients reported information.         Review of Systems   Constitutional, HEENT, cardiovascular, pulmonary, gi and gu systems are negative, except as otherwise noted.      Objective    Vitals - Patient Reported  Weight (Patient Reported): 95.3 kg (210 lb)  Height (Patient Reported): 171.5 cm (5' 7.5\")  BMI (Based on Pt Reported Ht/Wt): 32.4      Vitals:  No vitals were obtained today due to virtual visit.    Physical Exam   GENERAL: Healthy, alert and no distress  EYES: Eyes grossly normal to inspection.  No discharge or erythema, or obvious scleral/conjunctival abnormalities.  RESP: No audible wheeze, cough, or visible cyanosis.  No visible retractions or increased work of breathing.    SKIN: Visible skin clear. No significant rash, abnormal pigmentation or lesions.  NEURO: Cranial nerves grossly intact.  Mentation and speech appropriate for age.  PSYCH: Mentation appears normal, affect normal/bright, judgement and insight intact, normal speech and appearance well-groomed.                Video-Visit Details    Type of service:  Video Visit    Video Time:  Start: 12/01/2021 03:16 pm  Stop: 12/01/2021 03:29 pm    Originating Location (pt. Location): Home    Distant Location (provider location):  [facility] " [facility]     Platform used for Video Visit: Alyssa

## 2021-12-02 ASSESSMENT — PATIENT HEALTH QUESTIONNAIRE - PHQ9: SUM OF ALL RESPONSES TO PHQ QUESTIONS 1-9: 10

## 2022-06-08 DIAGNOSIS — F33.0 MAJOR DEPRESSIVE DISORDER, RECURRENT EPISODE, MILD (H): ICD-10-CM

## 2022-06-09 RX ORDER — CITALOPRAM HYDROBROMIDE 20 MG/1
TABLET ORAL
Qty: 90 TABLET | Refills: 0 | Status: SHIPPED | OUTPATIENT
Start: 2022-06-09 | End: 2023-03-07 | Stop reason: ALTCHOICE

## 2022-06-09 NOTE — TELEPHONE ENCOUNTER
Routing refill request to provider for review/approval because:  Patient overdue for visit - LOV 12/1/2021  PHQ-9 scored 10 on 12/1/2021  Ceci Olson RN

## 2022-10-16 DIAGNOSIS — G47.00 INSOMNIA, UNSPECIFIED TYPE: ICD-10-CM

## 2022-10-16 DIAGNOSIS — F33.0 MAJOR DEPRESSIVE DISORDER, RECURRENT EPISODE, MILD (H): ICD-10-CM

## 2022-10-16 NOTE — LETTER
October 21, 2022    Ellen Logan  [address] [place] [address]        Dear Ellen,    While reviewing your refill request, we noticed that you are due to have a IN PERSON visit with your Provider.  That appointment must be made before any additional refills can be approved.     Taking care of your health is important to us and we look forward to seeing you in the near future.  Please call us at [phone] or [phone] (or use Curbside) to schedule.  Please disregard this notice if you have already made an appointment.        Sincerely,          [facility] Team

## 2022-10-17 RX ORDER — TRAZODONE HYDROCHLORIDE 50 MG/1
TABLET, FILM COATED ORAL
Qty: 90 TABLET | Refills: 0 | OUTPATIENT
Start: 2022-10-17

## 2023-03-02 ASSESSMENT — PATIENT HEALTH QUESTIONNAIRE - PHQ9
SUM OF ALL RESPONSES TO PHQ QUESTIONS 1-9: 11
10. IF YOU CHECKED OFF ANY PROBLEMS, HOW DIFFICULT HAVE THESE PROBLEMS MADE IT FOR YOU TO DO YOUR WORK, TAKE CARE OF THINGS AT HOME, OR GET ALONG WITH OTHER PEOPLE: SOMEWHAT DIFFICULT
SUM OF ALL RESPONSES TO PHQ QUESTIONS 1-9: 11

## 2023-03-02 ASSESSMENT — ANXIETY QUESTIONNAIRES
5. BEING SO RESTLESS THAT IT IS HARD TO SIT STILL: NOT AT ALL
IF YOU CHECKED OFF ANY PROBLEMS ON THIS QUESTIONNAIRE, HOW DIFFICULT HAVE THESE PROBLEMS MADE IT FOR YOU TO DO YOUR WORK, TAKE CARE OF THINGS AT HOME, OR GET ALONG WITH OTHER PEOPLE: SOMEWHAT DIFFICULT
2. NOT BEING ABLE TO STOP OR CONTROL WORRYING: MORE THAN HALF THE DAYS
GAD7 TOTAL SCORE: 9
GAD7 TOTAL SCORE: 9
7. FEELING AFRAID AS IF SOMETHING AWFUL MIGHT HAPPEN: SEVERAL DAYS
3. WORRYING TOO MUCH ABOUT DIFFERENT THINGS: MORE THAN HALF THE DAYS
1. FEELING NERVOUS, ANXIOUS, OR ON EDGE: SEVERAL DAYS
6. BECOMING EASILY ANNOYED OR IRRITABLE: MORE THAN HALF THE DAYS
GAD7 TOTAL SCORE: 9
4. TROUBLE RELAXING: SEVERAL DAYS
8. IF YOU CHECKED OFF ANY PROBLEMS, HOW DIFFICULT HAVE THESE MADE IT FOR YOU TO DO YOUR WORK, TAKE CARE OF THINGS AT HOME, OR GET ALONG WITH OTHER PEOPLE?: SOMEWHAT DIFFICULT
7. FEELING AFRAID AS IF SOMETHING AWFUL MIGHT HAPPEN: SEVERAL DAYS

## 2023-03-07 ENCOUNTER — VIRTUAL VISIT (OUTPATIENT)
Dept: INTERNAL MEDICINE | Facility: CLINIC | Age: 28
End: 2023-03-07
Payer: COMMERCIAL

## 2023-03-07 DIAGNOSIS — F33.0 MAJOR DEPRESSIVE DISORDER, RECURRENT EPISODE, MILD (H): Primary | ICD-10-CM

## 2023-03-07 DIAGNOSIS — F41.9 ANXIETY: ICD-10-CM

## 2023-03-07 DIAGNOSIS — G47.00 INSOMNIA, UNSPECIFIED TYPE: ICD-10-CM

## 2023-03-07 PROCEDURE — 99213 OFFICE O/P EST LOW 20 MIN: CPT | Mod: VID | Performed by: INTERNAL MEDICINE

## 2023-03-07 RX ORDER — ESCITALOPRAM OXALATE 10 MG/1
TABLET ORAL
Qty: 90 TABLET | Refills: 0 | Status: SHIPPED | OUTPATIENT
Start: 2023-03-07 | End: 2023-06-26

## 2023-03-07 RX ORDER — TRAZODONE HYDROCHLORIDE 50 MG/1
25-50 TABLET, FILM COATED ORAL
Qty: 90 TABLET | Refills: 1 | Status: SHIPPED | OUTPATIENT
Start: 2023-03-07 | End: 2023-10-02

## 2023-03-07 ASSESSMENT — ANXIETY QUESTIONNAIRES: GAD7 TOTAL SCORE: 9

## 2023-03-07 ASSESSMENT — PATIENT HEALTH QUESTIONNAIRE - PHQ9
10. IF YOU CHECKED OFF ANY PROBLEMS, HOW DIFFICULT HAVE THESE PROBLEMS MADE IT FOR YOU TO DO YOUR WORK, TAKE CARE OF THINGS AT HOME, OR GET ALONG WITH OTHER PEOPLE: SOMEWHAT DIFFICULT
SUM OF ALL RESPONSES TO PHQ QUESTIONS 1-9: 11

## 2023-03-07 NOTE — PATIENT INSTRUCTIONS
Start Escitalopram (generic Lexapro) 5 mg (1/2 of 10 mg tablet) once per day for 10 days, then 10 mg once per day.  Let us know if you experience any significant side effects, which may include disordered appetite, disordered sleeping, intestinal upset.  Specifically let us know if you experience increase anxiety or depression.  Call [phone] ([facility] Internal Medicine Nurse Line) with any questions or concerns.      Follow up with me via virtual video visit in 3 months regardless of how you feel (sooner if needed)    *  I strongly recommend working with a mental health counselor/therapist to help understand your mood better, to learn better coping skills, and to find ways above and beyond medications to to help manage your mood condition.  I can provide a referral if you desire.       Options for mental health counseling:    [facility]  (psychology and psychiatry)  [email or web address]  --[place] [phone]  --[place]  [phone]  --[place]  [phone]      [facility]   ([email or web address])  --[place] ([address])  [phone]  --[place]  ([address])  [phone]       [address], [address]  [place], [place], [id number] [phone]    Behavioral Healthcare Providers Intake Scheduling [phone] or [phone]      [facility]  Counseling & mental health  [address], [address], [place]   [phone]     [place] [facility]  Counseling & mental health  [address], [place]   [phone]     [facility]  Counseling & mental health  [address], [place]   [phone]      [facility]   [address] [place] [phone]       *  Visit the web site National [place] of Mental Health web site (http://www.nimh.nih.gov/index.shtml)  for more information on anxiety, depression, and other conditions   "          Resources for any acute mental health issues:    *Crisis Intervention: 356.583.5354 or 016-430-0056 (TTY: 159.746.4683).  Call anytime for help.   *Crisis text line: Text \"START\" to 891-652 (Free - confidential - 24 hours per day/7 days per week)  *[facility] Crisis: COPE: (310.882.9331) 24 hour mobile crisis support for people having a mental health crisis in [facility].    *National Suicide Prevention  7-876-881-7395  *Acute Psychiatric Services (279-528-1774). 24-hour walk-in crisis psychiatric support at [facility]; Emergency Medications Clinic available 7:30am - 2:00pm  *Crisis Connection: (120.163.2928) 24-hour confidential telephone counseling    *[facility] Emergency Room: [phone]  *Suicide Awareness Voices of Education (SAVE) (www.save.org)  458.214.SAVE (9486)  *Mental Health Consumer/Survivor Network of [place] (www.mhcsn.net): 296.351.4773 or 272-757-0789    *Mental Health Association of [place] (www.mentalhealth.org): 942.231.1585 or 409-034-4135    *Self- Management and Recovery Training., SMART-- Toll free: 706.632.8358  www.[employer].org    *SELENE ALMAZAN (National [place] on Mental Illness):  phone: 638.612.7791  toll free: 1.888.SELENE.HELPS  fax: [phone]  email: [email or web address]  **Call or visit website for information on support groups for individuals or families.           "

## 2023-03-07 NOTE — PROGRESS NOTES
Ellen is a 27 year old who is being evaluated via a billable video visit.      How would you like to obtain your AVS? MyChart  If the video visit is dropped, the invitation should be resent by: Send to e-mail at: [email or web address]  Will anyone else be joining your video visit? No        Assessment & Plan   Problem List Items Addressed This Visit     Major depressive disorder, recurrent episode, mild (H) - Primary    Relevant Medications    traZODone (DESYREL) 50 MG tablet    escitalopram (LEXAPRO) 10 MG tablet    Anxiety    Relevant Medications    traZODone (DESYREL) 50 MG tablet    escitalopram (LEXAPRO) 10 MG tablet   Other Visit Diagnoses     Insomnia, unspecified type        Relevant Medications    traZODone (DESYREL) 50 MG tablet                  Start Escitalopram (generic Lexapro) 5 mg (1/2 of 10 mg tablet) once per day for 10 days, then 10 mg once per day.  Let us know if you experience any significant side effects, which may include disordered appetite, disordered sleeping, intestinal upset.  Specifically let us know if you experience increase anxiety or depression.  Call [phone] ([facility] Internal Medicine Nurse Line) with any questions or concerns.        Follow up with me via virtual video visit in 3 months regardless of how you feel (sooner if needed)    *  I strongly recommend working with a mental health counselor/therapist to help understand your mood better, to learn better coping skills, and to find ways above and beyond medications to to help manage your mood condition.  I can provide a referral if you desire.       Options for mental health counseling:    [facility]  (psychology and psychiatry)  [email or web address]  --[place] [phone]  --[place]  [phone]  --[place]  [phone]      [facility]   (http://[email or web address]/[facility])  --[place] ([address] [place] [address])  [phone]  --[place]  ([address])   [phone]       [facility]   [address], [address]  [place], [address]  [phone]    Behavioral Healthcare Providers Intake Scheduling [phone] or [phone]      [facility]  Counseling & mental health  [address], [address], [place]   [phone]     [place] [facility]  Counseling & mental health  [address] [facility] [facility]   [phone]     [facility]  Counseling & mental health  [address] [facility]   [phone]      [facility]   [address] [facility] [phone]       *  Visit the web site National [place] of Mental Health web site (http://www.nimh.nih.gov/index.shtml)  for more information on anxiety, depression, and other conditions            Resources for any acute mental health issues:    *Crisis Intervention: 884.120.7769 or 317-161-2248 (TTY: 126.591.1450).  Call anytime for help.   *Crisis text line: Text \"START\" to 098-629 (Free - confidential - 24 hours per day/7 days per week)  *[facility] Crisis: COPE: (129.138.5260) 24 hour mobile crisis support for people having a mental health crisis in [facility].    *National Suicide Prevention  1-345-253-5184  *Acute Psychiatric Services (906-430-4213). 24-hour walk-in crisis psychiatric support at [facility]; Emergency Medications Clinic available 7:30am - 2:00pm  *Crisis Connection: (504.147.2045) 24-hour confidential telephone counseling    *[facility] Emergency Room: 546.196.4083  *Suicide Awareness Voices of Education (SAVE) (www.save.org)  573.919.SAVE (7864)  *Mental Health Consumer/Survivor Network of [place] (www.mhcsn.net): 443.834.7538 or 944-567-4355    *Mental Health Association of [place] (www.mentalhealth.org): 880.188.9494 or 438-442-9852    *Self- Management and Recovery Training., SMART-- Toll free: 911.285.3611  www.[employer].org    *SELENE ALMAZAN (National [place] on Mental " Illness):  phone: 253.451.3773  toll free: 6.068.SELENE.HELPS  fax: [phone]  email: [email or web address]  **Call or visit website for information on support groups for individuals or families.                            Return in about 3 months (around 6/7/2023) for Virtual visit (video or phone), Medication check, Mood check.    Lyndon Vasquez MD  [facility]    Rd Hughes is a 27 year old, presenting for the following health issues:   Follow Up (Discuss anxiety medication)      History of Present Illness       Mental Health Follow-up:  Patient presents to follow-up on Depression & Anxiety.Patient's depression since last visit has been:  Medium  The patient is not having other symptoms associated with depression.  Patient's anxiety since last visit has been:  Worse  The patient is not having other symptoms associated with anxiety.  Any significant life events: No  Patient is feeling anxious or having panic attacks.  Patient has no concerns about alcohol or drug use.    She eats 2-3 servings of fruits and vegetables daily.She consumes 0 sweetened beverage(s) daily.She exercises with enough effort to increase her heart rate 10 to 19 minutes per day.  She exercises with enough effort to increase her heart rate 3 or less days per week. She is missing 2 dose(s) of medications per week.  She is not taking prescribed medications regularly due to remembering to take.    Today's PHQ-9         PHQ-9 Total Score: 11    PHQ-9 Q9 Thoughts of better off dead/self-harm past 2 weeks :   Not at all    How difficult have these problems made it for you to do your work, take care of things at home, or get along with other people: Somewhat difficult  Today's ROSALINA-7 Score: 9     Has been taking citalopram rarely ( last RX was from the summer 2022 for 90 days).    Uses trazodone 25-50 on occ for help sleping, no side effects       **I reviewed the information recorded in the patient's  "EPIC chart (including but not limited to medical history, surgical history, family history, problem list, medication list, and allergy list) and updated the information as indicated based on the patients reported information.           Review of Systems   Constitutional, HEENT, cardiovascular, pulmonary, gi and gu systems are negative, except as otherwise noted.      Objective    Vitals - Patient Reported  Weight (Patient Reported): 93 kg (205 lb)  Height (Patient Reported): 171.5 cm (5' 7.5\")  BMI (Based on Pt Reported Ht/Wt): 31.63  Pain Score: No Pain (0)      Vitals:  No vitals were obtained today due to virtual visit.    Physical Exam   GENERAL: Healthy, alert and no distress  EYES: Eyes grossly normal to inspection.  No discharge or erythema, or obvious scleral/conjunctival abnormalities.  RESP: No audible wheeze, cough, or visible cyanosis.  No visible retractions or increased work of breathing.    SKIN: Visible skin clear. No significant rash, abnormal pigmentation or lesions.  NEURO: Cranial nerves grossly intact.  Mentation and speech appropriate for age.  PSYCH: Mentation appears normal, affect normal/bright, judgement and insight intact, normal speech and appearance well-groomed.                Video-Visit Details    Type of service:  Video Visit     Joined the call at 3/7/2023, 11:55:00 am.  Left the call at 3/7/2023, 12:10:55 pm.  You were on the call for 15 minutes 54 seconds .    Originating Location (pt. Location): Home  Distant Location (provider location):  On-site  Platform used for Video Visit: Alyssa"

## 2023-03-26 ENCOUNTER — HEALTH MAINTENANCE LETTER (OUTPATIENT)
Age: 28
End: 2023-03-26

## 2023-05-25 ENCOUNTER — APPOINTMENT (OUTPATIENT)
Dept: URGENT CARE | Facility: CLINIC | Age: 28
End: 2023-05-25
Payer: COMMERCIAL

## 2023-06-25 ASSESSMENT — ANXIETY QUESTIONNAIRES
8. IF YOU CHECKED OFF ANY PROBLEMS, HOW DIFFICULT HAVE THESE MADE IT FOR YOU TO DO YOUR WORK, TAKE CARE OF THINGS AT HOME, OR GET ALONG WITH OTHER PEOPLE?: SOMEWHAT DIFFICULT
5. BEING SO RESTLESS THAT IT IS HARD TO SIT STILL: MORE THAN HALF THE DAYS
2. NOT BEING ABLE TO STOP OR CONTROL WORRYING: SEVERAL DAYS
6. BECOMING EASILY ANNOYED OR IRRITABLE: NEARLY EVERY DAY
4. TROUBLE RELAXING: SEVERAL DAYS
IF YOU CHECKED OFF ANY PROBLEMS ON THIS QUESTIONNAIRE, HOW DIFFICULT HAVE THESE PROBLEMS MADE IT FOR YOU TO DO YOUR WORK, TAKE CARE OF THINGS AT HOME, OR GET ALONG WITH OTHER PEOPLE: SOMEWHAT DIFFICULT
GAD7 TOTAL SCORE: 11
1. FEELING NERVOUS, ANXIOUS, OR ON EDGE: SEVERAL DAYS
7. FEELING AFRAID AS IF SOMETHING AWFUL MIGHT HAPPEN: MORE THAN HALF THE DAYS
GAD7 TOTAL SCORE: 11
GAD7 TOTAL SCORE: 11
7. FEELING AFRAID AS IF SOMETHING AWFUL MIGHT HAPPEN: MORE THAN HALF THE DAYS
3. WORRYING TOO MUCH ABOUT DIFFERENT THINGS: SEVERAL DAYS

## 2023-06-25 ASSESSMENT — PATIENT HEALTH QUESTIONNAIRE - PHQ9
SUM OF ALL RESPONSES TO PHQ QUESTIONS 1-9: 11
SUM OF ALL RESPONSES TO PHQ QUESTIONS 1-9: 11
10. IF YOU CHECKED OFF ANY PROBLEMS, HOW DIFFICULT HAVE THESE PROBLEMS MADE IT FOR YOU TO DO YOUR WORK, TAKE CARE OF THINGS AT HOME, OR GET ALONG WITH OTHER PEOPLE: SOMEWHAT DIFFICULT

## 2023-06-26 ENCOUNTER — VIRTUAL VISIT (OUTPATIENT)
Dept: INTERNAL MEDICINE | Facility: CLINIC | Age: 28
End: 2023-06-26
Payer: COMMERCIAL

## 2023-06-26 DIAGNOSIS — F41.9 ANXIETY: ICD-10-CM

## 2023-06-26 DIAGNOSIS — F33.0 MAJOR DEPRESSIVE DISORDER, RECURRENT EPISODE, MILD (H): Primary | ICD-10-CM

## 2023-06-26 DIAGNOSIS — G47.00 INSOMNIA, UNSPECIFIED TYPE: ICD-10-CM

## 2023-06-26 PROCEDURE — 99213 OFFICE O/P EST LOW 20 MIN: CPT | Mod: VID | Performed by: INTERNAL MEDICINE

## 2023-06-26 RX ORDER — ESCITALOPRAM OXALATE 10 MG/1
10 TABLET ORAL DAILY
Qty: 90 TABLET | Refills: 0 | Status: SHIPPED | OUTPATIENT
Start: 2023-06-26 | End: 2023-10-02

## 2023-06-26 RX ORDER — BUSPIRONE HYDROCHLORIDE 15 MG/1
TABLET ORAL
Qty: 180 TABLET | Refills: 0 | Status: SHIPPED | OUTPATIENT
Start: 2023-06-26 | End: 2023-10-02

## 2023-06-26 ASSESSMENT — ANXIETY QUESTIONNAIRES: GAD7 TOTAL SCORE: 11

## 2023-06-26 NOTE — PATIENT INSTRUCTIONS
I am glad the anxiety and depression are better with the Escitalopram.    Start additional medication to help control anxiety better:     -- Start buspirone (generic BuSpar) 7.5 mg (one half of 50 mg tablet) twice per day for 10 to 14 days, then 15 mg twice daily.     -- Main side effect may be drowsiness or fatigue.   Stop medication and contact us should you develop any concerns for side effects.     Continue all other medications at the same doses.  Contact your usual pharmacy if you need refills.     Follow-up via virtual video visit in approxi-3 months regardless of how you feel to reevaluate your condition.

## 2023-06-26 NOTE — PROGRESS NOTES
Ellen is a 27 year old who is being evaluated via a billable video visit.      How would you like to obtain your AVS? MyChart  If the video visit is dropped, the invitation should be resent by: Text to cell phone: [phone]  Will anyone else be joining your video visit? No        Assessment & Plan     (F33.0) Major depressive disorder, recurrent episode, mild (H)  (primary encounter diagnosis)  Comment: better, She has not experienced any significant side effects of this medication.   Continue same dose.   Plan: escitalopram (LEXAPRO) 10 MG tablet            (F41.9) Anxiety  Comment: better, but still there enough to do additional things.   Discussed increasing dose of escitalopram, but I think adding buspar will given better results.   Start Buspar 7.5 mg twice daily for 10-14 days, then 15 mg twice per day  Reviewed side effects   Plan: escitalopram (LEXAPRO) 10 MG tablet, busPIRone         (BUSPAR) 15 MG tablet            (G47.00) Insomnia, unspecified type  Comment: improved, takes trazodone occasionally only.   Plan:      Defer further discussion on individual conunseling as she does not feel it has benefitted her in the past.            Follow video vist in 3 months for [facility]     Lyndon Vasquze MD  [facility]   Ellen is a 27 year old, presenting for the following health issues:   Follow Up         No data to display              History of Present Illness       Mental Health Follow-up:  Patient presents to follow-up on Depression & Anxiety.Patient's depression since last visit has been:  No change  The patient is not having other symptoms associated with depression.  Patient's anxiety since last visit has been:  Medium  The patient is not having other symptoms associated with anxiety.  Any significant life events: No  Patient is feeling anxious or having panic attacks.  Patient has no concerns about alcohol or drug use.    She eats 2-3  mailed servings of fruits and vegetables daily.She consumes 0 sweetened beverage(s) daily.She exercises with enough effort to increase her heart rate 20 to 29 minutes per day.  She exercises with enough effort to increase her heart rate 4 days per week. She is missing 1 dose(s) of medications per week.  She is not taking prescribed medications regularly due to remembering to take.    Today's PHQ-9         PHQ-9 Total Score: 11    PHQ-9 Q9 Thoughts of better off dead/self-harm past 2 weeks :   Not at all    How difficult have these problems made it for you to do your work, take care of things at home, or get along with other people: Somewhat difficult  Today's ROSALINA-7 Score: 11     Depression better with starting escitlaopram.  Anxiety prevelance is ebtter, but still can be intense at times.   She has not experienced any significant side effects of this medication.     insomina better, takes trazodone infrequently.     She has tried individual counseling/therapy, but does not feel it has benefitted her.  She has nyla journalling, and confronting emotions better.       **I reviewed the information recorded in the patient's EPIC chart (including but not limited to medical history, surgical history, family history, problem list, medication list, and allergy list) and updated the information as indicated based on the patients reported information.           Review of Systems   Constitutional, HEENT, cardiovascular, pulmonary, gi and gu systems are negative, except as otherwise noted.      Objective    Vitals - Patient Reported  Pain Score: No Pain (0)      Vitals:  No vitals were obtained today due to virtual visit.    Physical Exam   GENERAL: Healthy, alert and no distress  EYES: Eyes grossly normal to inspection.  No discharge or erythema, or obvious scleral/conjunctival abnormalities.  RESP: No audible wheeze, cough, or visible cyanosis.  No visible retractions or increased work of breathing.    SKIN: Visible skin clear. No  significant rash, abnormal pigmentation or lesions.  NEURO: Cranial nerves grossly intact.  Mentation and speech appropriate for age.  PSYCH: Mentation appears normal, affect normal/bright, judgement and insight intact, normal speech and appearance well-groomed.                Video-Visit Details    Type of service:  Video Visit   Start Time 1145 AM  End silvino 12:01 PM    Originating Location (pt. Location): Home  Distant Location (provider location):  On-site  Platform used for Video Visit: Tyrell

## 2023-09-29 DIAGNOSIS — F41.9 ANXIETY: ICD-10-CM

## 2023-09-29 DIAGNOSIS — F33.0 MAJOR DEPRESSIVE DISORDER, RECURRENT EPISODE, MILD (H): ICD-10-CM

## 2023-09-29 DIAGNOSIS — G47.00 INSOMNIA, UNSPECIFIED TYPE: ICD-10-CM

## 2023-10-02 ENCOUNTER — VIRTUAL VISIT (OUTPATIENT)
Dept: INTERNAL MEDICINE | Facility: CLINIC | Age: 28
End: 2023-10-02
Payer: COMMERCIAL

## 2023-10-02 DIAGNOSIS — G47.00 INSOMNIA, UNSPECIFIED TYPE: ICD-10-CM

## 2023-10-02 DIAGNOSIS — F41.9 ANXIETY: ICD-10-CM

## 2023-10-02 DIAGNOSIS — F33.0 MAJOR DEPRESSIVE DISORDER, RECURRENT EPISODE, MILD (H): ICD-10-CM

## 2023-10-02 PROCEDURE — 99213 OFFICE O/P EST LOW 20 MIN: CPT | Mod: VID | Performed by: INTERNAL MEDICINE

## 2023-10-02 RX ORDER — TRAZODONE HYDROCHLORIDE 50 MG/1
25-50 TABLET, FILM COATED ORAL
Qty: 90 TABLET | Refills: 1 | Status: SHIPPED | OUTPATIENT
Start: 2023-10-02

## 2023-10-02 RX ORDER — BUSPIRONE HYDROCHLORIDE 15 MG/1
15 TABLET ORAL 2 TIMES DAILY
Qty: 180 TABLET | Refills: 3 | Status: SHIPPED | OUTPATIENT
Start: 2023-10-02

## 2023-10-02 RX ORDER — ESCITALOPRAM OXALATE 10 MG/1
10 TABLET ORAL DAILY
Qty: 90 TABLET | Refills: 0 | OUTPATIENT
Start: 2023-10-02

## 2023-10-02 RX ORDER — TRAZODONE HYDROCHLORIDE 50 MG/1
TABLET, FILM COATED ORAL
Qty: 90 TABLET | Refills: 0 | OUTPATIENT
Start: 2023-10-02

## 2023-10-02 RX ORDER — ESCITALOPRAM OXALATE 10 MG/1
10 TABLET ORAL DAILY
Qty: 90 TABLET | Refills: 3 | Status: SHIPPED | OUTPATIENT
Start: 2023-10-02 | End: 2024-09-06

## 2023-10-02 RX ORDER — BUSPIRONE HYDROCHLORIDE 15 MG/1
TABLET ORAL
Qty: 180 TABLET | Refills: 0 | OUTPATIENT
Start: 2023-10-02

## 2023-10-02 ASSESSMENT — ANXIETY QUESTIONNAIRES
6. BECOMING EASILY ANNOYED OR IRRITABLE: SEVERAL DAYS
3. WORRYING TOO MUCH ABOUT DIFFERENT THINGS: NOT AT ALL
4. TROUBLE RELAXING: MORE THAN HALF THE DAYS
IF YOU CHECKED OFF ANY PROBLEMS ON THIS QUESTIONNAIRE, HOW DIFFICULT HAVE THESE PROBLEMS MADE IT FOR YOU TO DO YOUR WORK, TAKE CARE OF THINGS AT HOME, OR GET ALONG WITH OTHER PEOPLE: SOMEWHAT DIFFICULT
GAD7 TOTAL SCORE: 6
2. NOT BEING ABLE TO STOP OR CONTROL WORRYING: NOT AT ALL
1. FEELING NERVOUS, ANXIOUS, OR ON EDGE: SEVERAL DAYS
GAD7 TOTAL SCORE: 6
5. BEING SO RESTLESS THAT IT IS HARD TO SIT STILL: MORE THAN HALF THE DAYS
7. FEELING AFRAID AS IF SOMETHING AWFUL MIGHT HAPPEN: NOT AT ALL

## 2023-10-02 ASSESSMENT — PATIENT HEALTH QUESTIONNAIRE - PHQ9
10. IF YOU CHECKED OFF ANY PROBLEMS, HOW DIFFICULT HAVE THESE PROBLEMS MADE IT FOR YOU TO DO YOUR WORK, TAKE CARE OF THINGS AT HOME, OR GET ALONG WITH OTHER PEOPLE: SOMEWHAT DIFFICULT
SUM OF ALL RESPONSES TO PHQ QUESTIONS 1-9: 10
SUM OF ALL RESPONSES TO PHQ QUESTIONS 1-9: 10

## 2023-10-02 NOTE — PATIENT INSTRUCTIONS
"   Continue all medications at the same doses.  Contact your usual pharmacy if you need refills.     Look into a \"mail order\" pharmacy which will allow you to get 3 months of medications delivered to your house.   Contact your insurance provider to find out what options for mail order pharmacies.   Some common mail oredr pharmacies are [facility], [facility], [facility], [facility], etc.    Some insurance plans will only allow you to get 1 month at a time from a \"brick and mortar\" pharmacy ([employer] [employer], [facility], [facility], [facility], etc)    If you have a mail order option for getting medications, set up the account with them and let me know where to send the prescriptions and I can send prescriptions to them      Covid vaccines are now recommended annually for all adults.  The most recent updated Covid vaccine has been approved and is now widely available at any pharmacy or vaccine clinic location.  You may receive the COVID-vaccine at the same time as the annual influenza vaccine if desired.   Please get the annual influenza vaccine (\"flu shot\") when it is available this fall.  Ideally you should wait until early October for the longest lasting protection throughout the winter.  Make sure you have the influenza vaccine by middle October so you are ready to be protected by November 1, when the flu season typically starts.    You can get this from almost any pharmacy or our clinic location.  Check our web site or [employer] page for details about special \"flu shot clinics\" that we usually run in late September/October.        Follow up with GYN clinic in the spring to remove the IUD if you are due for that.  They can check your blood pressure, etc.      Follow up with me in approximately 6 months, eithe rin person or virtual video visit.  Contact us sooner for anything else.     "

## 2023-10-02 NOTE — PROGRESS NOTES
"Ellen is a 28 year old who is being evaluated via a billable video visit.      How would you like to obtain your AVS? MyChart  If the video visit is dropped, the invitation should be resent by: Text to cell phone: [phone]  Will anyone else be joining your video visit? No          Assessment & Plan     (F41.9) Anxiety  Comment: doing well, better with the addition of buspirone.   Continue same meds.   Plan: busPIRone (BUSPAR) 15 MG tablet, escitalopram         (LEXAPRO) 10 MG tablet            (F33.0) Major depressive disorder, recurrent episode, mild (H24)  Comment: doing well, continue same meds.   Plan: escitalopram (LEXAPRO) 10 MG tablet, traZODone         (DESYREL) 50 MG tablet            (G47.00) Insomnia, unspecified type  Comment: uses rarely, refill for future use if needed.   Plan: traZODone (DESYREL) 50 MG tablet              Continue all medications at the same doses.  Contact your usual pharmacy if you need refills.     Look into a \"mail order\" pharmacy which will allow you to get 3 months of medications delivered to your house.   Contact your insurance provider to find out what options for mail order pharmacies.   Some common mail oredr pharmacies are [facility], [facility], [facility], [email or web address], etc.    Some insurance plans will only allow you to get 1 month at a time from a \"brick and mortar\" pharmacy ([facility], [facility], [facility], [facility], etc)    If you have a mail order option for getting medications, set up the account with them and let me know where to send the prescriptions and I can send prescriptions to them      Covid vaccines are now recommended annually for all adults.  The most recent updated Covid vaccine has been approved and is now widely available at any pharmacy or vaccine clinic location.  You may receive the COVID-vaccine at the same time as the annual influenza vaccine if desired.   Please get the annual influenza vaccine (\"flu shot\") when it is available this " "fall.  Ideally you should wait until early October for the longest lasting protection throughout the winter.  Make sure you have the influenza vaccine by middle October so you are ready to be protected by November 1, when the flu season typically starts.    You can get this from almost any pharmacy or our clinic location.  Check our web site or DoYouRemember page for details about special \"flu shot clinics\" that we usually run in late September/October.        Follow up with GYN clinic in the spring to remove the IUD if you are due for that.  They can check your blood pressure, etc.      Follow up with me in approximately 6 months, eithe rin person or virtual video visit.  Contact us sooner for anything else.            Lyndon Vasquez MD  [facility]    Rd Hughes is a 28 year old, presenting for the following health issues:  Recheck Medication and MH Follow Up        10/2/2023     5:12 PM   Additional Questions   Roomed by Bebe CATHERINE CMA       History of Present Illness       Mental Health Follow-up:  Patient presents to follow-up on Depression & Anxiety.Patient's depression since last visit has been:  Better  The patient is not having other symptoms associated with depression.  Patient's anxiety since last visit has been:  Better  The patient is not having other symptoms associated with anxiety.  Any significant life events: No  Patient is feeling anxious or having panic attacks.  Patient has no concerns about alcohol or drug use.    She eats 2-3 servings of fruits and vegetables daily.She consumes 0 sweetened beverage(s) daily.She exercises with enough effort to increase her heart rate 20 to 29 minutes per day.  She exercises with enough effort to increase her heart rate 4 days per week. She is missing 1 dose(s) of medications per week.  She is not taking prescribed medications regularly due to remembering to take.     Doing much better with addition of buspar in " "addition to the escitalopram.   She has not experienced any significant side effects of this medication.   Has gotten feedback from loved ones stating that she \"seems different\" in a good way.     Anxiety and depression are both doing well.     2.)  needs to have IUD removed in the spring, will return to GYN clinic for that.       **I reviewed the information recorded in the patient's EPIC chart (including but not limited to medical history, surgical history, family history, problem list, medication list, and allergy list) and updated the information as indicated based on the patients reported information.         Review of Systems   Constitutional, HEENT, cardiovascular, pulmonary, gi and gu systems are negative, except as otherwise noted.      Objective    Vitals - Patient Reported  Weight (Patient Reported): 95.3 kg (210 lb)  Height (Patient Reported): 12.7 cm (5\")  BMI (Based on Pt Reported Ht/Wt): 5905.78  Pain Score: No Pain (0)      Vitals:  No vitals were obtained today due to virtual visit.    Physical Exam   GENERAL: Healthy, alert and no distress  EYES: Eyes grossly normal to inspection.  No discharge or erythema, or obvious scleral/conjunctival abnormalities.  RESP: No audible wheeze, cough, or visible cyanosis.  No visible retractions or increased work of breathing.    SKIN: Visible skin clear. No significant rash, abnormal pigmentation or lesions.  NEURO: Cranial nerves grossly intact.  Mentation and speech appropriate for age.  PSYCH: Mentation appears normal, affect normal/bright, judgement and insight intact, normal speech and appearance well-groomed.                Video-Visit Details    Type of service:  Video Visit     Joined the call at 10/2/2023, 5:40:37 pm.  Left the call at 10/2/2023, 5:52:14 pm.  You were on the call for 11 minutes 37 seconds .      Originating Location (pt. Location):     Distant Location (provider location):  On-site  Platform used for Video Visit: Alyssa"

## 2024-02-13 NOTE — PROGRESS NOTES
Ellen is a 28 year old  female who presents for annual exam.     Besides routine health maintenance, would like Kyleena replaced.  HPI:  Ellen is here for an annual exam. We reviewed that an annual exam is a full appointment and she will need to schedule a separate appointment for her IUD removal/replacement. Her Kyleena is also coming up requesting prior authorization so we will submit that before her IUD appointment.   Menses are absent with IUD placed.   No LMP recorded. (Menstrual status: IUD)..   Using IUD for contraception.    She is not currently considering pregnancy.    REPRODUCTIVE/GYNECOLOGIC HISTORY:  Ellen is sexually active with male partner(s) and is currently in monogamous relationship.   STI testing offered?  Declined  History of abnormal Pap smear:  No  SOCIAL HISTORY  Abuse: does not report having previously been physical or sexually abused.    Do you feel safe in your environment? YES       She  reports that she quit smoking about 3 years ago. Her smoking use included cigarettes. She smoked an average of 0.5 packs per day. She has never used smokeless tobacco.      Last PHQ-9 score on record =       2/15/2024     8:46 AM   PHQ-9 SCORE   PHQ-9 Total Score 8     Last GAD7 score on record =       2/15/2024     8:46 AM   ROSALINA-7 SCORE   Total Score 12     Alcohol Score = about 6 drinks per week     HEALTH MAINTENANCE:          Never done ADVANCE CARE PLANNING (Every 5 Years)       Never done HEPATITIS B IMMUNIZATION (1 of 3 - 3-dose series)       1994 IPV IMMUNIZATION (2 of 3 - 4-dose series)  Last completed: May 4, 1994     Never done HIV SCREENING (Once)       Never done HEPATITIS C SCREENING (Once)       2018 LIPID (Yearly)  Last completed: 2017     AUG 28   2018 DTAP/TDAP/TD IMMUNIZATION (7 - Td or Tdap)- Yes  Last completed: Aug 28, 2008          SEP 1   2023 INFLUENZA VACCINE (1)- declined  Last completed: Aug 28, 2008     SEP 1   2023 COVID-19 Vaccine (4 -  - season)- yes  Last completed: Dec 1, 2021            HEALTHY HABITS  Eating habits: eats regular meals and follows a balanced nutrition diet most of the time with occasional fast food   Exercise: How often do you exercise? 1-3 times/week;Volleyball and biking. Casual walking of dog daily. Encouraged to increase the speed of walking with her dog for improved cardiac effect.   Have you had an eye exam in the last two years? YES     Do you routinely see the dentist once or twice yearly? NO, recommended      HISTORY:  OB History    Para Term  AB Living   0 0 0 0 0 0   SAB IAB Ectopic Multiple Live Births   0 0 0 0 0     Past Medical History:   Diagnosis Date    Anxiety     Depression      Past Surgical History:   Procedure Laterality Date    ------------OTHER-------------      Cyst removed from head    wisdom teeth      2013     Family History   Problem Relation Age of Onset    Cardiovascular Paternal Grandfather     Cardiovascular Paternal Uncle         D/C age 50    Cancer Maternal Grandmother         Breast    Heart Disease Father      Social History     Socioeconomic History    Marital status: Single   Tobacco Use    Smoking status: Former     Packs/day: .5     Types: Cigarettes     Quit date: 2020     Years since quitting: 3.2    Smokeless tobacco: Never   Substance and Sexual Activity    Alcohol use: Yes     Comment: social    Drug use: No    Sexual activity: Yes     Partners: Male     Birth control/protection: Pill   Other Topics Concern     Service No    Blood Transfusions No    Caffeine Concern Yes    Occupational Exposure No    Hobby Hazards No    Sleep Concern No    Stress Concern No    Weight Concern No    Special Diet No    Back Care No    Exercise Yes    Bike Helmet No    Seat Belt Yes    Self-Exams Yes     Social Determinants of Health     Financial Resource Strain: Low Risk  (10/2/2023)    Financial Resource Strain     Within the past 12 months, have you or your  "family members you live with been unable to get utilities (heat, electricity) when it was really needed?: No   Food Insecurity: Low Risk  (10/2/2023)    Food Insecurity     Within the past 12 months, did you worry that your food would run out before you got money to buy more?: No     Within the past 12 months, did the food you bought just not last and you didn t have money to get more?: No   Transportation Needs: Low Risk  (10/2/2023)    Transportation Needs     Within the past 12 months, has lack of transportation kept you from medical appointments, getting your medicines, non-medical meetings or appointments, work, or from getting things that you need?: No   Housing Stability: Low Risk  (10/2/2023)    Housing Stability     Do you have housing? : Yes     Are you worried about losing your housing?: No       Current Outpatient Medications:     busPIRone (BUSPAR) 15 MG tablet, Take 1 tablet (15 mg) by mouth 2 times daily, Disp: 180 tablet, Rfl: 3    escitalopram (LEXAPRO) 10 MG tablet, Take 1 tablet (10 mg) by mouth daily, Disp: 90 tablet, Rfl: 3    levonorgestrel (KYLEENA) 19.5 MG IUD, 1 each by Intrauterine route once, Disp: , Rfl:     traZODone (DESYREL) 50 MG tablet, Take 0.5-1 tablets (25-50 mg) by mouth nightly as needed for sleep, Disp: 90 tablet, Rfl: 1    vitamin D3 (CHOLECALCIFEROL) 50 mcg (2000 units) tablet, Take 1 tablet (50 mcg) by mouth daily, Disp: , Rfl:      Allergies   Allergen Reactions    Ceclor [Cefaclor]        Past medical, surgical, social and family history were reviewed and updated in EPIC.    ROS:   12 point review of systems negative other than symptoms noted below or in the HPI.    PHYSICAL EXAM:  /76   Ht 1.702 m (5' 7\")   Wt 98 kg (216 lb)   BMI 33.83 kg/m     BMI: Body mass index is 33.83 kg/m .  Constitutional: healthy, alert and no distress  Neck: symmetrical, thyroid normal size, no masses present, no lymphadenopathy present.   Cardiovascular: RRR, no murmurs " present  Respiratory: breathing unlabored, lungs CTA bilaterally  Breast:declined  Gastrointestinal: abdomen soft, non-tender, bowel sounds present  PELVIC EXAM:  Vulva: deferred  Vagina: deferred  Cervix:deferred- would like to get pap with IUD replacement.   Uterus: Normal size, non-tender, mobile  Ovaries: No masses palpated  Rectal exam: deferred    ASSESSMENT/PLAN:    ICD-10-CM    1. Encounter for removal and reinsertion of intrauterine contraceptive device  Z30.433 DISCONTINUED: levonorgestrel (KYLEENA) 19.5 MG IUD 1 each     CANCELED: INSERTION INTRAUTERINE DEVICE     CANCELED: REMOVE INTRAUTERINE DEVICE      2. Routine Papanicolaou smear  Z12.4 Pap thin layer screen with HPV - recommended age 30 - 65 years      3. Well woman exam  Z01.419 CBC with platelets     Hemoglobin A1c     Lipid panel reflex to direct LDL Fasting        No results found for any visits on 02/15/24.      COUNSELING:   Reviewed preventive health counseling, as reflected in patient instructions       Regular exercise       Healthy diet/nutrition       Visiondental screening       Immunizations  Vaccinated for: TDAP           Alcohol Use       Contraception    Kathia Hager CNM

## 2024-02-15 ENCOUNTER — OFFICE VISIT (OUTPATIENT)
Dept: MIDWIFE SERVICES | Facility: CLINIC | Age: 29
End: 2024-02-15
Payer: COMMERCIAL

## 2024-02-15 ENCOUNTER — TELEPHONE (OUTPATIENT)
Dept: MIDWIFE SERVICES | Facility: CLINIC | Age: 29
End: 2024-02-15

## 2024-02-15 VITALS
BODY MASS INDEX: 33.9 KG/M2 | SYSTOLIC BLOOD PRESSURE: 118 MMHG | DIASTOLIC BLOOD PRESSURE: 76 MMHG | HEIGHT: 67 IN | WEIGHT: 216 LBS

## 2024-02-15 DIAGNOSIS — Z30.433 ENCOUNTER FOR REMOVAL AND REINSERTION OF INTRAUTERINE CONTRACEPTIVE DEVICE: Primary | ICD-10-CM

## 2024-02-15 DIAGNOSIS — Z12.4 ROUTINE PAPANICOLAOU SMEAR: ICD-10-CM

## 2024-02-15 DIAGNOSIS — Z01.419 WELL WOMAN EXAM: ICD-10-CM

## 2024-02-15 DIAGNOSIS — Z23 ENCOUNTER FOR IMMUNIZATION: ICD-10-CM

## 2024-02-15 LAB
CHOLEST SERPL-MCNC: 225 MG/DL
ERYTHROCYTE [DISTWIDTH] IN BLOOD BY AUTOMATED COUNT: 12.3 % (ref 10–15)
FASTING STATUS PATIENT QL REPORTED: YES
HBA1C MFR BLD: 5 % (ref 0–5.6)
HCT VFR BLD AUTO: 40.8 % (ref 35–47)
HDLC SERPL-MCNC: 54 MG/DL
HGB BLD-MCNC: 14.2 G/DL (ref 11.7–15.7)
LDLC SERPL CALC-MCNC: 133 MG/DL
MCH RBC QN AUTO: 31.6 PG (ref 26.5–33)
MCHC RBC AUTO-ENTMCNC: 34.8 G/DL (ref 31.5–36.5)
MCV RBC AUTO: 91 FL (ref 78–100)
NONHDLC SERPL-MCNC: 171 MG/DL
PLATELET # BLD AUTO: 360 10E3/UL (ref 150–450)
RBC # BLD AUTO: 4.5 10E6/UL (ref 3.8–5.2)
TRIGL SERPL-MCNC: 189 MG/DL
WBC # BLD AUTO: 14.1 10E3/UL (ref 4–11)

## 2024-02-15 PROCEDURE — 85027 COMPLETE CBC AUTOMATED: CPT | Performed by: ADVANCED PRACTICE MIDWIFE

## 2024-02-15 PROCEDURE — 90715 TDAP VACCINE 7 YRS/> IM: CPT | Performed by: ADVANCED PRACTICE MIDWIFE

## 2024-02-15 PROCEDURE — 80061 LIPID PANEL: CPT | Performed by: ADVANCED PRACTICE MIDWIFE

## 2024-02-15 PROCEDURE — 36415 COLL VENOUS BLD VENIPUNCTURE: CPT | Performed by: ADVANCED PRACTICE MIDWIFE

## 2024-02-15 PROCEDURE — 90471 IMMUNIZATION ADMIN: CPT | Performed by: ADVANCED PRACTICE MIDWIFE

## 2024-02-15 PROCEDURE — 83036 HEMOGLOBIN GLYCOSYLATED A1C: CPT | Performed by: ADVANCED PRACTICE MIDWIFE

## 2024-02-15 PROCEDURE — 99385 PREV VISIT NEW AGE 18-39: CPT | Mod: 25 | Performed by: ADVANCED PRACTICE MIDWIFE

## 2024-02-15 ASSESSMENT — ANXIETY QUESTIONNAIRES
7. FEELING AFRAID AS IF SOMETHING AWFUL MIGHT HAPPEN: SEVERAL DAYS
GAD7 TOTAL SCORE: 12
IF YOU CHECKED OFF ANY PROBLEMS ON THIS QUESTIONNAIRE, HOW DIFFICULT HAVE THESE PROBLEMS MADE IT FOR YOU TO DO YOUR WORK, TAKE CARE OF THINGS AT HOME, OR GET ALONG WITH OTHER PEOPLE: SOMEWHAT DIFFICULT
5. BEING SO RESTLESS THAT IT IS HARD TO SIT STILL: SEVERAL DAYS
2. NOT BEING ABLE TO STOP OR CONTROL WORRYING: MORE THAN HALF THE DAYS
1. FEELING NERVOUS, ANXIOUS, OR ON EDGE: MORE THAN HALF THE DAYS
6. BECOMING EASILY ANNOYED OR IRRITABLE: MORE THAN HALF THE DAYS
3. WORRYING TOO MUCH ABOUT DIFFERENT THINGS: MORE THAN HALF THE DAYS
GAD7 TOTAL SCORE: 12

## 2024-02-15 ASSESSMENT — PATIENT HEALTH QUESTIONNAIRE - PHQ9
SUM OF ALL RESPONSES TO PHQ QUESTIONS 1-9: 8
5. POOR APPETITE OR OVEREATING: MORE THAN HALF THE DAYS

## 2024-02-15 NOTE — ADDENDUM NOTE
Addended by: RICHARD HANNA on: 2/15/2024 10:43 AM     Modules accepted: Orders, Level of Service

## 2024-02-15 NOTE — NURSING NOTE
"Chief Complaint   Patient presents with    Physical    IUD       Initial /76   Ht 1.702 m (5' 7\")   Wt 98 kg (216 lb)   BMI 33.83 kg/m   Estimated body mass index is 33.83 kg/m  as calculated from the following:    Height as of this encounter: 1.702 m (5' 7\").    Weight as of this encounter: 98 kg (216 lb).  BP completed using cuff size: regular    Questioned patient about current smoking habits.  Pt. has never smoked.          The following HM Due: pap smear    Would like Kyleena replaced today, expires in March of this year  Isabel Zhou CMA on 2/15/2024 at 8:36 AM    "

## 2024-02-15 NOTE — TELEPHONE ENCOUNTER
Prior Authorization Specialty Medication Request    Medication/Dose:   Diagnosis and ICD code (if different than what is on RX):  levonorgestrel (KYLEENA) 19.5 MG IUD   New/renewal/insurance change PA/secondary ins. PA:  Previously Tried and Failed:  Pt currently has levonorgestrel (KYLEENA) 19.5 MG IUD and needs it to be replaced by March to ensure proper birth control.    Important Lab Values: n/a  Rationale: Z30.430    Insurance   Primary: [employer]   Insurance ID:  [id number]     Secondary (if applicable):n/a  Insurance ID:  n/a    Pharmacy Information (if different than what is on RX)  This medication is administered in the clinic.    Pt has an appt on 3/8 for placement, please expedite.    SUKI Sosa

## 2024-02-15 NOTE — TELEPHONE ENCOUNTER
Call to [facility]  at . Let them know about when Kyleena order was placed today, it was indicated that a PA auth would be needed,  stated that as long as the diagnosis code is for contraception, it will be okay to use without PA auth.    SUKI Sosa

## 2024-03-08 ENCOUNTER — OFFICE VISIT (OUTPATIENT)
Dept: OBGYN | Facility: CLINIC | Age: 29
End: 2024-03-08
Payer: COMMERCIAL

## 2024-03-08 VITALS
DIASTOLIC BLOOD PRESSURE: 68 MMHG | WEIGHT: 220 LBS | BODY MASS INDEX: 34.53 KG/M2 | HEIGHT: 67 IN | SYSTOLIC BLOOD PRESSURE: 110 MMHG

## 2024-03-08 DIAGNOSIS — Z11.3 SCREEN FOR STD (SEXUALLY TRANSMITTED DISEASE): ICD-10-CM

## 2024-03-08 DIAGNOSIS — Z12.4 SCREENING FOR CERVICAL CANCER: ICD-10-CM

## 2024-03-08 DIAGNOSIS — Z30.433 ENCOUNTER FOR REMOVAL AND REINSERTION OF INTRAUTERINE CONTRACEPTIVE DEVICE: Primary | ICD-10-CM

## 2024-03-08 PROCEDURE — 87624 HPV HI-RISK TYP POOLED RSLT: CPT | Performed by: STUDENT IN AN ORGANIZED HEALTH CARE EDUCATION/TRAINING PROGRAM

## 2024-03-08 PROCEDURE — 58301 REMOVE INTRAUTERINE DEVICE: CPT | Performed by: STUDENT IN AN ORGANIZED HEALTH CARE EDUCATION/TRAINING PROGRAM

## 2024-03-08 PROCEDURE — 58300 INSERT INTRAUTERINE DEVICE: CPT | Performed by: STUDENT IN AN ORGANIZED HEALTH CARE EDUCATION/TRAINING PROGRAM

## 2024-03-08 PROCEDURE — G0145 SCR C/V CYTO,THINLAYER,RESCR: HCPCS | Performed by: STUDENT IN AN ORGANIZED HEALTH CARE EDUCATION/TRAINING PROGRAM

## 2024-03-08 PROCEDURE — 87491 CHLMYD TRACH DNA AMP PROBE: CPT | Performed by: STUDENT IN AN ORGANIZED HEALTH CARE EDUCATION/TRAINING PROGRAM

## 2024-03-08 PROCEDURE — 87591 N.GONORRHOEAE DNA AMP PROB: CPT | Performed by: STUDENT IN AN ORGANIZED HEALTH CARE EDUCATION/TRAINING PROGRAM

## 2024-03-08 NOTE — NURSING NOTE
"Chief Complaint   Patient presents with    IUD       Initial /68   Ht 1.702 m (5' 7\")   Wt 99.8 kg (220 lb)   BMI 34.46 kg/m   Estimated body mass index is 34.46 kg/m  as calculated from the following:    Height as of this encounter: 1.702 m (5' 7\").    Weight as of this encounter: 99.8 kg (220 lb).  BP completed using cuff size: regular    Questioned patient about current smoking habits.  Pt. has never smoked.          The following HM Due: pap smear    Isabel Zhou CMA on 3/8/2024 at 9:05 AM    "

## 2024-03-08 NOTE — PROGRESS NOTES
"[facility]  Gynecology Office Visit    CC: Kyleena remove/replace, pap    S:  Ellen Logan is a 28 year old  who presents for the above. She is here alone.    - Kyleena for 5 years. Happy with it. Rare spotting with periods.     Obstetric History:       Gynecologic History:   - LMP: No LMP recorded. (Menstrual status: IUD).   - Last pap:     O:  /68   Ht 1.702 m (5' 7\")   Wt 99.8 kg (220 lb)   BMI 34.46 kg/m      Exam:  GEN: Appears well, NAD  : Normal external genitalia. Vagina normal. Cervix normal, nulliparous. Bimanual limited by habitus. Normal discharge, no lesions or bleeding. Exam chaperoned by Bren Luo MA    Procedure: Kyleena Insertion and Removal  The r/b/a and alternives of the procedure were discussed and consent was signed, and patient identifiers x2 were confirmed. The speculum was placed and cervix visualized. Pap test collected. GC/CT swab collected. Kyleena strings visualized, grasped, and IUD removed intact. Cervix cleansed with betadine x3. Tenaculum placed on anterior lip of cervix. The uterus was sounded to 5.5 cm. The Kyleena IUD was inserted according to the 's insertion device. The strings were trimmed 2 cm below the cervical os. The tenaculum was removed. Excellent hemostasis with pressure. Patient tolerated procedure well. EBL <1 ml. Procedure chaperoned by Bren Luo MA    A/P:  Ellen Logan is a 28 year old  who presents for Kyleena removal/replacement and pap smear.    #Contraception  - Kyleena replaced. Good for 5 years.  - Follow-up 5-6 weeks for string check    #Cervical cancer screening  - Pap test today    #STI screening  - GC/CT    Bossman Kim MD MPH  2024 9:20 AM    "

## 2024-03-09 LAB
C TRACH DNA SPEC QL NAA+PROBE: NEGATIVE
N GONORRHOEA DNA SPEC QL NAA+PROBE: NEGATIVE

## 2024-03-12 LAB
BKR LAB AP GYN ADEQUACY: NORMAL
BKR LAB AP GYN INTERPRETATION: NORMAL
BKR LAB AP GYN OTHER FINDINGS: NORMAL
BKR LAB AP HPV REFLEX: NORMAL
BKR LAB AP PREVIOUS ABNORMAL: NORMAL
PATH REPORT.COMMENTS IMP SPEC: NORMAL
PATH REPORT.COMMENTS IMP SPEC: NORMAL
PATH REPORT.RELEVANT HX SPEC: NORMAL

## 2024-03-13 ENCOUNTER — TELEPHONE (OUTPATIENT)
Dept: OBGYN | Facility: CLINIC | Age: 29
End: 2024-03-13
Payer: COMMERCIAL

## 2024-03-13 PROCEDURE — 99207 PR NON-BILLABLE SERV PER CHARTING: CPT | Performed by: STUDENT IN AN ORGANIZED HEALTH CARE EDUCATION/TRAINING PROGRAM

## 2024-03-13 NOTE — TELEPHONE ENCOUNTER
Telephone Call  03/13/2024    Called Ellen Logan to discuss findings on pap smear of Bacteria morphologically consistent with Actinomyces spp     Discussed this finding with Ellen and the possibility that if Actinomyces is present it could lead to a PID like infection. However, I would not recommend IUD removal and treatment unless she is having symptoms and pelvic pain. At this time she is not having any pain or symptoms and together we decided to pursue expectant management. If symptoms start she will follow-up.    Caleb Kim MD

## 2024-03-14 LAB
HUMAN PAPILLOMA VIRUS 16 DNA: NEGATIVE
HUMAN PAPILLOMA VIRUS 18 DNA: NEGATIVE
HUMAN PAPILLOMA VIRUS FINAL DIAGNOSIS: NORMAL
HUMAN PAPILLOMA VIRUS OTHER HR: NEGATIVE

## 2024-05-26 ENCOUNTER — HEALTH MAINTENANCE LETTER (OUTPATIENT)
Age: 29
End: 2024-05-26

## 2024-09-06 DIAGNOSIS — F33.0 MAJOR DEPRESSIVE DISORDER, RECURRENT EPISODE, MILD (H): ICD-10-CM

## 2024-09-06 DIAGNOSIS — F41.9 ANXIETY: ICD-10-CM

## 2024-09-06 RX ORDER — ESCITALOPRAM OXALATE 10 MG/1
10 TABLET ORAL DAILY
Qty: 90 TABLET | Refills: 0 | Status: SHIPPED | OUTPATIENT
Start: 2024-09-06

## 2024-11-30 DIAGNOSIS — F33.0 MAJOR DEPRESSIVE DISORDER, RECURRENT EPISODE, MILD (H): ICD-10-CM

## 2024-11-30 DIAGNOSIS — G47.00 INSOMNIA, UNSPECIFIED TYPE: ICD-10-CM

## 2024-12-02 RX ORDER — TRAZODONE HYDROCHLORIDE 50 MG/1
25-50 TABLET, FILM COATED ORAL
Qty: 90 TABLET | Refills: 0 | Status: SHIPPED | OUTPATIENT
Start: 2024-12-02

## 2024-12-05 DIAGNOSIS — F41.9 ANXIETY: ICD-10-CM

## 2024-12-05 DIAGNOSIS — F33.0 MAJOR DEPRESSIVE DISORDER, RECURRENT EPISODE, MILD (H): ICD-10-CM

## 2024-12-05 RX ORDER — ESCITALOPRAM OXALATE 10 MG/1
10 TABLET ORAL DAILY
Qty: 90 TABLET | Refills: 0 | Status: SHIPPED | OUTPATIENT
Start: 2024-12-05

## 2024-12-05 RX ORDER — BUSPIRONE HYDROCHLORIDE 15 MG/1
15 TABLET ORAL 2 TIMES DAILY
Qty: 180 TABLET | Refills: 0 | Status: SHIPPED | OUTPATIENT
Start: 2024-12-05

## 2024-12-05 NOTE — TELEPHONE ENCOUNTER
Prescription(s) sent electronically to specified pharmacy.    Patient was contacted and responded that she will make an appointment in the early 2025

## 2025-03-05 DIAGNOSIS — F33.0 MAJOR DEPRESSIVE DISORDER, RECURRENT EPISODE, MILD: ICD-10-CM

## 2025-03-05 DIAGNOSIS — F41.9 ANXIETY: ICD-10-CM

## 2025-03-05 RX ORDER — ESCITALOPRAM OXALATE 10 MG/1
10 TABLET ORAL DAILY
Qty: 90 TABLET | Refills: 0 | Status: SHIPPED | OUTPATIENT
Start: 2025-03-05

## 2025-03-11 ENCOUNTER — VIRTUAL VISIT (OUTPATIENT)
Dept: INTERNAL MEDICINE | Facility: CLINIC | Age: 30
End: 2025-03-11
Payer: COMMERCIAL

## 2025-03-11 DIAGNOSIS — F33.0 MAJOR DEPRESSIVE DISORDER, RECURRENT EPISODE, MILD: ICD-10-CM

## 2025-03-11 DIAGNOSIS — F41.9 ANXIETY: ICD-10-CM

## 2025-03-11 DIAGNOSIS — G47.00 INSOMNIA, UNSPECIFIED TYPE: ICD-10-CM

## 2025-03-11 PROCEDURE — 98005 SYNCH AUDIO-VIDEO EST LOW 20: CPT | Performed by: INTERNAL MEDICINE

## 2025-03-11 RX ORDER — TRAZODONE HYDROCHLORIDE 50 MG/1
25-50 TABLET ORAL
Qty: 90 TABLET | Refills: 3 | Status: SHIPPED | OUTPATIENT
Start: 2025-03-11

## 2025-03-11 RX ORDER — BUSPIRONE HYDROCHLORIDE 15 MG/1
15 TABLET ORAL 2 TIMES DAILY
Qty: 180 TABLET | Refills: 3 | Status: SHIPPED | OUTPATIENT
Start: 2025-03-11

## 2025-03-11 RX ORDER — ESCITALOPRAM OXALATE 10 MG/1
10 TABLET ORAL DAILY
Qty: 90 TABLET | Refills: 3 | Status: SHIPPED | OUTPATIENT
Start: 2025-03-11

## 2025-03-11 ASSESSMENT — ANXIETY QUESTIONNAIRES
GAD7 TOTAL SCORE: 8
2. NOT BEING ABLE TO STOP OR CONTROL WORRYING: MORE THAN HALF THE DAYS
GAD7 TOTAL SCORE: 8
4. TROUBLE RELAXING: NOT AT ALL
8. IF YOU CHECKED OFF ANY PROBLEMS, HOW DIFFICULT HAVE THESE MADE IT FOR YOU TO DO YOUR WORK, TAKE CARE OF THINGS AT HOME, OR GET ALONG WITH OTHER PEOPLE?: SOMEWHAT DIFFICULT
5. BEING SO RESTLESS THAT IT IS HARD TO SIT STILL: NOT AT ALL
1. FEELING NERVOUS, ANXIOUS, OR ON EDGE: SEVERAL DAYS
6. BECOMING EASILY ANNOYED OR IRRITABLE: SEVERAL DAYS
7. FEELING AFRAID AS IF SOMETHING AWFUL MIGHT HAPPEN: MORE THAN HALF THE DAYS
3. WORRYING TOO MUCH ABOUT DIFFERENT THINGS: MORE THAN HALF THE DAYS
7. FEELING AFRAID AS IF SOMETHING AWFUL MIGHT HAPPEN: MORE THAN HALF THE DAYS
IF YOU CHECKED OFF ANY PROBLEMS ON THIS QUESTIONNAIRE, HOW DIFFICULT HAVE THESE PROBLEMS MADE IT FOR YOU TO DO YOUR WORK, TAKE CARE OF THINGS AT HOME, OR GET ALONG WITH OTHER PEOPLE: SOMEWHAT DIFFICULT
GAD7 TOTAL SCORE: 8

## 2025-03-11 ASSESSMENT — PATIENT HEALTH QUESTIONNAIRE - PHQ9
SUM OF ALL RESPONSES TO PHQ QUESTIONS 1-9: 8
SUM OF ALL RESPONSES TO PHQ QUESTIONS 1-9: 8
10. IF YOU CHECKED OFF ANY PROBLEMS, HOW DIFFICULT HAVE THESE PROBLEMS MADE IT FOR YOU TO DO YOUR WORK, TAKE CARE OF THINGS AT HOME, OR GET ALONG WITH OTHER PEOPLE: SOMEWHAT DIFFICULT

## 2025-03-11 NOTE — PROGRESS NOTES
Ellen is a 29 year old who is being evaluated via a billable video visit.    How would you like to obtain your AVS? MyChart  If the video visit is dropped, the invitation should be resent by: Text to cell phone: [phone]  Will anyone else be joining your video visit? No      Assessment & Plan     (F41.9) Anxiety  Comment: overall states that she is doing well still some lingering mild anxiety, but not enough to warrant a change in medication.   If anxiety gets high enouhg, consider either lamictal or possibly very dose abilify  Until then continue same meds, same doses.   Plan: busPIRone (BUSPAR) 15 MG tablet, escitalopram         (LEXAPRO) 10 MG tablet            (F33.0) Major depressive disorder, recurrent episode, mild  Comment: doing well, continue same meds.   Plan: traZODone (DESYREL) 50 MG tablet, escitalopram         (LEXAPRO) 10 MG tablet            (G47.00) Insomnia, unspecified type  Comment: This condition is currently controlled on the current medical regimen.  Continue current therapy.   Plan: traZODone (DESYREL) 50 MG tablet                           Subjective   Ellen is a 29 year old, presenting for the following health issues:   Follow Up        3/11/2025     3:26 PM   Additional Questions   Roomed by Bebe CATHERINE CMA     History of Present Illness       Mental Health Follow-up:  Patient presents to follow-up on Depression & Anxiety.Patient's depression since last visit has been:  Good  The patient is not having other symptoms associated with depression.  Patient's anxiety since last visit has been:  Medium  The patient is not having other symptoms associated with anxiety.  Any significant life events: No  Patient is feeling anxious or having panic attacks.  Patient has no concerns about alcohol or drug use.    She eats 2-3 servings of fruits and vegetables daily.She consumes 0 sweetened beverage(s) daily.She exercises with enough effort to increase her heart rate 20 to 29 minutes per day.   She exercises with enough effort to increase her heart rate 5 days per week. She is missing 1 dose(s) of medications per week.  She is not taking prescribed medications regularly due to remembering to take.      Phq-9 (Phq-9)-Developed By Emilee Sandhu,Edita Conner,Meño Edwards And Colleagues,With An Educational Leandro From [employer] 2002.    Question 3/11/2025  3:22 PM CDT - Filed by Patient   The following questionnaire should only be answered by the patient. Are you the patient? Yes        Over the last 2 weeks, how often have you been bothered by any of the following problems?    1. Little interest or pleasure in doing things More than half the days   2.  Feeling down, depressed, or hopeless Several days   3.  Trouble falling or staying asleep, or sleeping too much Several days   4.  Feeling tired or having little energy Several days   5.  Poor appetite or overeating Several days   6.  Feeling bad about yourself - or that you are a failure or have let yourself or your family down Not at all   7.  Trouble concentrating on things, such as reading the newspaper or watching television More than half the days   8.  Moving or speaking so slowly that other people could have noticed. Or the opposite - being so fidgety or restless that you have been moving around a lot more than usual Not at all   9.  Thoughts that you would be better off dead, or of hurting yourself in some way Not at all   If you checked off any problems, how difficult have these problems made it for you to do your work, take care of things at home, or get along with other people? Somewhat difficult        PHQ9 TOTAL SCORE (range: 0 - 27) 8 (Mild depression)     Eusebio-7 ([employer],2002; Used With Permission)    Question 3/11/2025  3:23 PM CDT - Filed by Patient   The following questionnaire should only be answered by the patient. Are you the patient? Yes   EUSEBIO-7(Used with permission: [employer] 2002)    Over the last two weeks, how  often have you been bothered by the following problems?    1. Feeling nervous, anxious, or on edge Several days   2. Not being able to stop or control worrying More than half the days   3. Worrying too much about different things More than half the days   4. Trouble relaxing Not at all   5. Being so restless that it is hard to sit still Not at all   6. Becoming easily annoyed or irritable Several days   7. Feeling afraid, as if something awful might happen More than half the days   If you checked off any problems on this questionnaire, how difficult have these problems made it for you to do your work, take care of things at home, or get along with other people? Somewhat difficult   ROSALINA-7(Used with permission: [employer] 2002)    ROSALINA 7 TOTAL SCORE (range: 0 - 21) 8 (mild anxiety)       **I reviewed the information recorded in the patient's EPIC chart (including but not limited to medical history, surgical history, family history, problem list, medication list, and allergy list) and updated the information as indicated based on the patients reported information.         Review of Systems  Constitutional, HEENT, cardiovascular, pulmonary, gi and gu systems are negative, except as otherwise noted.      Objective           Vitals:  No vitals were obtained today due to virtual visit.    Physical Exam   GENERAL: alert and no distress  EYES: Eyes grossly normal to inspection.  No discharge or erythema, or obvious scleral/conjunctival abnormalities.  RESP: No audible wheeze, cough, or visible cyanosis.    SKIN: Visible skin clear. No significant rash, abnormal pigmentation or lesions.  NEURO: Cranial nerves grossly intact.  Mentation and speech appropriate for age.  PSYCH: Appropriate affect, tone, and pace of words          Video-Visit Details    Type of service:  Video Visit     Joined the call at 3/11/2025, 4:12:24 pm.  Left the call at 3/11/2025, 4:30:21 pm.  You were on the call for 17 minutes 56 seconds  .    Originating Location (pt. Location): Home    Distant Location (provider location):  On-site  Platform used for Video Visit: Alyssa  Signed Electronically by: Lyndon Vasquez MD

## 2025-06-14 ENCOUNTER — HEALTH MAINTENANCE LETTER (OUTPATIENT)
Age: 30
End: 2025-06-14